# Patient Record
Sex: MALE | Race: WHITE | NOT HISPANIC OR LATINO | Employment: OTHER | URBAN - METROPOLITAN AREA
[De-identification: names, ages, dates, MRNs, and addresses within clinical notes are randomized per-mention and may not be internally consistent; named-entity substitution may affect disease eponyms.]

---

## 2018-08-08 PROCEDURE — 88305 TISSUE EXAM BY PATHOLOGIST: CPT | Performed by: PATHOLOGY

## 2018-08-09 ENCOUNTER — LAB REQUISITION (OUTPATIENT)
Dept: LAB | Facility: HOSPITAL | Age: 73
End: 2018-08-09
Payer: MEDICARE

## 2018-08-09 DIAGNOSIS — D12.3 BENIGN NEOPLASM OF TRANSVERSE COLON: ICD-10-CM

## 2018-08-09 DIAGNOSIS — K57.30 DIVERTICULOSIS OF LARGE INTESTINE WITHOUT PERFORATION OR ABSCESS WITHOUT BLEEDING: ICD-10-CM

## 2018-08-09 DIAGNOSIS — Z86.010 HISTORY OF COLONIC POLYPS: ICD-10-CM

## 2018-08-09 DIAGNOSIS — D12.4 BENIGN NEOPLASM OF DESCENDING COLON: ICD-10-CM

## 2019-09-13 ENCOUNTER — OFFICE VISIT (OUTPATIENT)
Dept: URGENT CARE | Facility: CLINIC | Age: 74
End: 2019-09-13
Payer: MEDICARE

## 2019-09-13 ENCOUNTER — APPOINTMENT (OUTPATIENT)
Dept: RADIOLOGY | Facility: CLINIC | Age: 74
End: 2019-09-13
Payer: MEDICARE

## 2019-09-13 VITALS
BODY MASS INDEX: 34.3 KG/M2 | HEART RATE: 63 BPM | DIASTOLIC BLOOD PRESSURE: 65 MMHG | TEMPERATURE: 96.8 F | SYSTOLIC BLOOD PRESSURE: 139 MMHG | RESPIRATION RATE: 20 BRPM | OXYGEN SATURATION: 99 % | HEIGHT: 70 IN | WEIGHT: 239.6 LBS
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
## 2019-09-13 PROCEDURE — 72220 X-RAY EXAM SACRUM TAILBONE: CPT

## 2019-09-13 PROCEDURE — 99213 OFFICE O/P EST LOW 20 MIN: CPT | Performed by: PHYSICIAN ASSISTANT

## 2019-09-13 PROCEDURE — G0463 HOSPITAL OUTPT CLINIC VISIT: HCPCS | Performed by: PHYSICIAN ASSISTANT

## 2019-09-13 RX ORDER — DIPHENOXYLATE HYDROCHLORIDE AND ATROPINE SULFATE 2.5; .025 MG/1; MG/1
1 TABLET ORAL DAILY
COMMUNITY

## 2019-09-13 RX ORDER — METOPROLOL SUCCINATE 50 MG/1
50 TABLET, EXTENDED RELEASE ORAL
COMMUNITY

## 2019-09-13 RX ORDER — HYDROCHLOROTHIAZIDE 25 MG/1
25 TABLET ORAL
COMMUNITY

## 2019-09-13 RX ORDER — OXYCODONE HYDROCHLORIDE 5 MG/1
5-10 TABLET ORAL EVERY 4 HOURS PRN
COMMUNITY
Start: 2019-08-19 | End: 2021-08-13 | Stop reason: ALTCHOICE

## 2019-09-13 RX ORDER — SENNA AND DOCUSATE SODIUM 50; 8.6 MG/1; MG/1
1 TABLET, FILM COATED ORAL 2 TIMES DAILY PRN
COMMUNITY
Start: 2019-08-20 | End: 2021-08-23

## 2019-09-13 RX ORDER — CELECOXIB 200 MG/1
200 CAPSULE ORAL DAILY
COMMUNITY
Start: 2019-08-21 | End: 2021-08-23

## 2019-09-13 RX ORDER — ESOMEPRAZOLE MAGNESIUM 40 MG/1
40 FOR SUSPENSION ORAL
COMMUNITY

## 2019-09-13 RX ORDER — RIBOFLAVIN (VITAMIN B2) 100 MG
100 TABLET ORAL DAILY
COMMUNITY

## 2019-09-13 RX ORDER — TRAMADOL HYDROCHLORIDE 50 MG/1
50 TABLET ORAL EVERY 6 HOURS PRN
COMMUNITY
Start: 2019-08-19 | End: 2021-08-13 | Stop reason: ALTCHOICE

## 2019-09-13 RX ORDER — METHOCARBAMOL 500 MG/1
500 TABLET, FILM COATED ORAL 4 TIMES DAILY PRN
COMMUNITY
Start: 2019-08-19 | End: 2021-08-13 | Stop reason: ALTCHOICE

## 2019-09-13 RX ORDER — SILODOSIN 8 MG/1
8 CAPSULE ORAL
COMMUNITY

## 2019-09-13 RX ORDER — ATORVASTATIN CALCIUM 10 MG/1
10 TABLET, FILM COATED ORAL
COMMUNITY

## 2019-09-13 NOTE — PROGRESS NOTES
[facility]        NAME: Harman Damon  is a 72 y o  male  : 1945    MRN: [id number]  DATE: 2019  TIME: 11:36 AM    Assessment and Plan   Fall, initial encounter [W19  XXXA]  1  Fall, initial encounter  XR sacrum and coccyx       Patient Instructions     Recommend rest, ice, heat, and OTC tylenol  Follow up with PCP in 3-5 days  Proceed to  ER if symptoms worsen  Chief Complaint     Chief Complaint   Patient presents with    Tailbone Pain     patient reports he fell yesterday while pulling out a small suitcase from car and fell on ground twisting  back landing on coccyx area  Reports pain is a 1, when it is shooting/stabbing it is positional   Took Tylenol at 0930 with little releif  History of Present Illness       Patient presents with complaint of pain between his buttocks after falling on his backside yesterday  Pt states that the pain is minimal at rest but reports sharp pain with changes in position  Pt reports taking Tylenol but denies other palliative measures  Pt denies paresthesias, radiation of pain, incontinence of bowel or bladder, chest pain, dyspnea, fever, and chills  Pt reports taking 200 mg Celebrex daily for his recent knee replacement  Pt denies knee pain or injuring the knee with yesterday's fall  Review of Systems   Review of Systems   Constitutional: Negative for chills, fatigue and fever  HENT: Negative for congestion, ear pain, postnasal drip, rhinorrhea and sore throat  Respiratory: Negative for cough, chest tightness and shortness of breath  Cardiovascular: Negative for chest pain  Gastrointestinal: Negative for abdominal pain, blood in stool, diarrhea, nausea and vomiting  Musculoskeletal: Positive for back pain  Negative for myalgias, neck pain and neck stiffness  Skin: Negative for color change, rash and wound  Neurological: Negative for dizziness, weakness, light-headedness, numbness and headaches     All other systems reviewed and are negative          Current Medications       Current Outpatient Medications:     aspirin 81 MG tablet, Take 81 mg by mouth daily, Disp: , Rfl:     celecoxib (CeleBREX) 200 mg capsule, Take 200 mg by mouth daily, Disp: , Rfl:     methocarbamol (ROBAXIN) 500 mg tablet, Take 500 mg by mouth 4 (four) times a day as needed, Disp: , Rfl:     oxyCODONE (ROXICODONE) 5 mg immediate release tablet, Take 5-10 mg by mouth every 4 (four) hours as needed, Disp: , Rfl:     senna-docusate sodium (SENOKOT-S) 8 6-50 mg per tablet, Take 1 tablet by mouth 2 (two) times a day as needed, Disp: , Rfl:     traMADol (ULTRAM) 50 mg tablet, Take 50 mg by mouth every 6 (six) hours as needed, Disp: , Rfl:     Ascorbic Acid (VITAMIN C) 100 MG tablet, Take 100 mg by mouth daily, Disp: , Rfl:     atorvastatin (LIPITOR) 10 mg tablet, Take 10 mg by mouth, Disp: , Rfl:     B Complex-C-Folic Acid (NEPHROCAPS PO), Take 1 capsule by mouth, Disp: , Rfl:     esomeprazole (NEXIUM) 40 mg packet, Take 40 mg by mouth, Disp: , Rfl:     Folic Acid-Cholecalciferol 1-3775 MG-UNIT CAPS, Take by mouth, Disp: , Rfl:     hydrochlorothiazide (HYDRODIURIL) 25 mg tablet, Take 25 mg by mouth, Disp: , Rfl:     metoprolol succinate (TOPROL-XL) 50 mg 24 hr tablet, Take 50 mg by mouth, Disp: , Rfl:     multivitamin (THERAGRAN) TABS, Take 1 tablet by mouth daily, Disp: , Rfl:     mupirocin (BACTROBAN) 2 % nasal ointment, into each nostril 2 (two) times a day, Disp: , Rfl:     Silodosin 8 MG CAPS, Take 8 mg by mouth, Disp: , Rfl:     Current Allergies     Allergies as of 09/13/2019    (No Known Allergies)            The following portions of the patient's history were reviewed and updated as appropriate: allergies, current medications, past family history, past medical history, past social history, past surgical history and problem list      Past Medical History:   Diagnosis Date    Hypercholesteremia        Past Surgical History: Procedure Laterality Date    KNEE ARTHROPLASTY         History reviewed  No pertinent family history  Medications have been verified  Objective   /65   Pulse 63   Temp (!) 96 8 °F (36 °C)   Resp 20   Ht 5' 10" (1 778 m)   Wt 109 kg (239 lb 9 6 oz)   SpO2 99%   BMI 34 38 kg/m²        Physical Exam     Physical Exam   Constitutional: He is oriented to person, place, and time  He appears well-developed and well-nourished  No distress  HENT:   Head: Normocephalic and atraumatic  Eyes: Pupils are equal, round, and reactive to light  Conjunctivae and EOM are normal    Neck: Normal range of motion  Neck supple  Cardiovascular: Normal rate, regular rhythm and normal heart sounds  Pulmonary/Chest: Effort normal and breath sounds normal  No respiratory distress  Musculoskeletal: He exhibits no edema or tenderness  No skin changes; NTTP over affected area; AROM limited d/t pain; LE strength intact; LE sensation intact   Lymphadenopathy:     He has no cervical adenopathy  Neurological: He is alert and oriented to person, place, and time  No cranial nerve deficit or sensory deficit  Skin: Skin is warm and dry  Capillary refill takes less than 2 seconds  No rash noted  He is not diaphoretic  Psychiatric: He has a normal mood and affect  His behavior is normal  Thought content normal    Nursing note and vitals reviewed

## 2020-07-28 ENCOUNTER — APPOINTMENT (EMERGENCY)
Dept: RADIOLOGY | Facility: HOSPITAL | Age: 75
End: 2020-07-28
Payer: MEDICARE

## 2020-07-28 ENCOUNTER — HOSPITAL ENCOUNTER (EMERGENCY)
Facility: HOSPITAL | Age: 75
Discharge: HOME | End: 2020-07-28
Attending: EMERGENCY MEDICINE | Admitting: EMERGENCY MEDICINE
Payer: MEDICARE

## 2020-07-28 VITALS
DIASTOLIC BLOOD PRESSURE: 63 MMHG | OXYGEN SATURATION: 97 % | TEMPERATURE: 98.3 F | RESPIRATION RATE: 18 BRPM | SYSTOLIC BLOOD PRESSURE: 139 MMHG | HEART RATE: 60 BPM
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
## 2020-07-28 DIAGNOSIS — S76.019A HIP STRAIN: ICD-10-CM

## 2020-07-28 DIAGNOSIS — S76.212A GROIN STRAIN, LEFT, INITIAL ENCOUNTER: Primary | ICD-10-CM

## 2020-07-28 PROCEDURE — 73552 X-RAY EXAM OF FEMUR 2/>: CPT

## 2020-07-28 PROCEDURE — 72170 X-RAY EXAM OF PELVIS: CPT

## 2020-07-28 PROCEDURE — 99284 EMERGENCY DEPT VISIT MOD MDM: CPT | Performed by: EMERGENCY MEDICINE

## 2020-07-28 PROCEDURE — 99283 EMERGENCY DEPT VISIT LOW MDM: CPT
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
## 2021-06-28 ENCOUNTER — APPOINTMENT (OUTPATIENT)
Dept: LAB | Facility: AMBULARY SURGERY CENTER | Age: 76
End: 2021-06-28
Payer: MEDICARE

## 2021-06-28 ENCOUNTER — TELEPHONE (OUTPATIENT)
Dept: UROLOGY | Facility: MEDICAL CENTER | Age: 76
End: 2021-06-28

## 2021-06-28 DIAGNOSIS — R30.0 DYSURIA: Primary | ICD-10-CM

## 2021-06-28 DIAGNOSIS — R30.0 DYSURIA: ICD-10-CM

## 2021-06-28 LAB
BACTERIA UR QL AUTO: ABNORMAL /HPF
BILIRUB UR QL STRIP: NEGATIVE
CLARITY UR: ABNORMAL
COLOR UR: YELLOW
GLUCOSE UR STRIP-MCNC: NEGATIVE MG/DL
HGB UR QL STRIP.AUTO: ABNORMAL
KETONES UR STRIP-MCNC: ABNORMAL MG/DL
LEUKOCYTE ESTERASE UR QL STRIP: ABNORMAL
NITRITE UR QL STRIP: POSITIVE
NON-SQ EPI CELLS URNS QL MICRO: ABNORMAL /HPF
PH UR STRIP.AUTO: 6 [PH]
PROT UR STRIP-MCNC: ABNORMAL MG/DL
RBC #/AREA URNS AUTO: ABNORMAL /HPF
SP GR UR STRIP.AUTO: 1.01 (ref 1–1.03)
UROBILINOGEN UR QL STRIP.AUTO: 0.2 E.U./DL
WBC #/AREA URNS AUTO: ABNORMAL /HPF

## 2021-06-28 PROCEDURE — 87086 URINE CULTURE/COLONY COUNT: CPT

## 2021-06-28 PROCEDURE — 81001 URINALYSIS AUTO W/SCOPE: CPT

## 2021-06-28 PROCEDURE — 87186 SC STD MICRODIL/AGAR DIL: CPT

## 2021-06-28 PROCEDURE — 87077 CULTURE AEROBIC IDENTIFY: CPT

## 2021-06-28 RX ORDER — CEPHALEXIN 500 MG/1
500 CAPSULE ORAL EVERY 12 HOURS SCHEDULED
Qty: 10 CAPSULE | Refills: 0 | Status: SHIPPED | OUTPATIENT
Start: 2021-06-28 | End: 2021-07-03

## 2021-06-28 NOTE — TELEPHONE ENCOUNTER
Called Will Nichols and notified him that script were put in for him to give a urine sample  He will go to [facility] [place] as that is close to his house   We will notify him when we get results

## 2021-06-28 NOTE — TELEPHONE ENCOUNTER
Recommend patient go for urine testing, to confirm infection and obtain sensitivity  Orders in [address]

## 2021-06-28 NOTE — TELEPHONE ENCOUNTER
Patient of Dr Saint Clair  Patient was advised by answer service we are taking calls for doctor office  Patient has uti  Patient was out of town was given medication but medication is not working  Patient is having discomfort,urgency and burning along with incontinence  Patient was prescribed levofloxacin 500 mg for last 3 days and has not improved  [facility] [place]

## 2021-06-28 NOTE — TELEPHONE ENCOUNTER
Patient is patient of  [facility] he was prescribed abx but I don't see any urine studies done   Please advise If I can send him for studies or if he should go to urgent care/

## 2021-06-28 NOTE — TELEPHONE ENCOUNTER
Patient requesting call back to discuss results  Patient in a lot of discomfort but understands we need results

## 2021-06-29 NOTE — TELEPHONE ENCOUNTER
Called Fuentes Duran back and notified him of  Kelflex being sent to pharmacy  He states he took Levoquin today that he has as a standing order for this   Told him to wait to take Keflex until tomorrow

## 2021-06-30 LAB — BACTERIA UR CULT: ABNORMAL

## 2021-06-30 NOTE — TELEPHONE ENCOUNTER
Patient's urine culture confirmed infection  He was noted to have resistance to Levaquin which is why antibiotic was ineffective  However, he should complete Keflex as prescribed  [facility]

## 2021-06-30 NOTE — TELEPHONE ENCOUNTER
Call placed to patient, advised per provider urine culture confirmed infection  He was noted to have resistance to Levaquin which is why antibiotic was ineffective  However, he should complete Keflex as prescribed  Advised patient to  continue follow-up with Dr Jose Raul Omer  Patient verbalized understanding and agrees with plan

## 2021-07-29 ENCOUNTER — TELEPHONE (OUTPATIENT)
Dept: OTHER | Facility: HOSPITAL | Age: 76
End: 2021-07-29

## 2021-07-29 ENCOUNTER — TELEPHONE (OUTPATIENT)
Dept: UROLOGY | Facility: CLINIC | Age: 76
End: 2021-07-29

## 2021-07-29 NOTE — TELEPHONE ENCOUNTER
I spoke with Dr Cal Rocha about this patient who has BPH and a history of urethral stricture and persistent urinary retention  I believe the patient is an appropriate candidate for the Uro lift based upon his anatomy as described  I would not recommend a transurethral resection of the prostate based on his small prostate size and his history of urethral stricture disease  Dr Nilo Beck as recommended sending him to our practice to be scheduled for a Uro lift procedure for me  I am happy to do this on behalf of the patient  Please schedule the patient for a preop visit with 1 of our advanced practitioners the Nilo Quick please also hold a date for Uro lift at the [place] [id number] with me    Case request can be placed by the AP when he is seen in the office

## 2021-08-02 ENCOUNTER — TELEPHONE (OUTPATIENT)
Dept: OTHER | Facility: OTHER | Age: 76
End: 2021-08-02

## 2021-08-02 NOTE — TELEPHONE ENCOUNTER
Pt said the catheter script that was sent to the pharmacy is going to take 10 days to refill and he is currently all out of catheters

## 2021-08-03 NOTE — TELEPHONE ENCOUNTER
Patient of Dr Tariq Hinton who will be seeing our office only for urolift  I do not see any recent scripts in our system  Please advise patient to reach out to Dr Tariq Hinton for his catheters

## 2021-08-03 NOTE — TELEPHONE ENCOUNTER
Called patient  He notes he called Dr Billy Beltre office and he is out of the office on vacation  The answering machine gave him message to call our office  He only has 2 days supply left  Please call at [phone]

## 2021-08-03 NOTE — TELEPHONE ENCOUNTER
Patient stopped into [facility]or [facility] office  He reports he had script from Dr Ko Payment office sent to [facility]  He states something was not filled out correctly, but now Dr Nestor Mcgee is on vacation and cannot get catheters  Patient provided with samples of what we had in the office to hold him over until Dr Nestor Mcgee gets back  He was very thankful for this and knows to call if he needs more in the interim

## 2021-08-13 ENCOUNTER — TELEPHONE (OUTPATIENT)
Dept: UROLOGY | Facility: AMBULATORY SURGERY CENTER | Age: 76
End: 2021-08-13

## 2021-08-13 ENCOUNTER — CONSULT (OUTPATIENT)
Dept: UROLOGY | Facility: CLINIC | Age: 76
End: 2021-08-13
Payer: MEDICARE

## 2021-08-13 VITALS
WEIGHT: 236 LBS | BODY MASS INDEX: 34.96 KG/M2 | SYSTOLIC BLOOD PRESSURE: 130 MMHG | HEIGHT: 69 IN | HEART RATE: 61 BPM | DIASTOLIC BLOOD PRESSURE: 68 MMHG

## 2021-08-13 DIAGNOSIS — N40.1 BENIGN PROSTATIC HYPERPLASIA WITH URINARY RETENTION: ICD-10-CM

## 2021-08-13 DIAGNOSIS — R30.0 DYSURIA: Primary | ICD-10-CM

## 2021-08-13 DIAGNOSIS — R33.8 BENIGN PROSTATIC HYPERPLASIA WITH URINARY RETENTION: ICD-10-CM

## 2021-08-13 PROBLEM — I10 HYPERTENSION: Status: ACTIVE | Noted: 2021-08-13

## 2021-08-13 PROBLEM — J45.909 UNCOMPLICATED ASTHMA: Status: ACTIVE | Noted: 2021-08-13

## 2021-08-13 PROBLEM — E78.00 HIGH CHOLESTEROL: Status: ACTIVE | Noted: 2021-08-13

## 2021-08-13 PROBLEM — K22.70 BARRETT'S ESOPHAGUS: Status: ACTIVE | Noted: 2021-08-13

## 2021-08-13 LAB — POST-VOID RESIDUAL VOLUME, ML POC: 0 ML

## 2021-08-13 PROCEDURE — 51798 US URINE CAPACITY MEASURE: CPT | Performed by: PHYSICIAN ASSISTANT

## 2021-08-13 PROCEDURE — 99203 OFFICE O/P NEW LOW 30 MIN: CPT | Performed by: PHYSICIAN ASSISTANT

## 2021-08-13 RX ORDER — CEPHALEXIN 500 MG/1
CAPSULE ORAL
COMMUNITY
End: 2021-08-13 | Stop reason: ALTCHOICE

## 2021-08-13 RX ORDER — FLUOROURACIL 50 MG/G
CREAM TOPICAL
COMMUNITY
End: 2021-08-13 | Stop reason: ALTCHOICE

## 2021-08-13 RX ORDER — BUDESONIDE 90 UG/1
AEROSOL, POWDER RESPIRATORY (INHALATION) AS NEEDED
COMMUNITY

## 2021-08-13 RX ORDER — PREDNISONE 20 MG/1
TABLET ORAL DAILY
COMMUNITY
Start: 2021-08-06 | End: 2021-08-13 | Stop reason: ALTCHOICE

## 2021-08-13 RX ORDER — CHLORAL HYDRATE 500 MG
CAPSULE ORAL
COMMUNITY

## 2021-08-13 RX ORDER — METHENAMINE HIPPURATE 1000 MG/1
TABLET ORAL
COMMUNITY
End: 2021-08-13 | Stop reason: ALTCHOICE

## 2021-08-13 RX ORDER — AZITHROMYCIN 250 MG/1
TABLET, FILM COATED ORAL
COMMUNITY
Start: 2021-08-06 | End: 2021-08-13 | Stop reason: ALTCHOICE

## 2021-08-13 RX ORDER — DOXYCYCLINE HYCLATE 20 MG
TABLET ORAL
COMMUNITY
End: 2021-08-13 | Stop reason: ALTCHOICE

## 2021-08-13 RX ORDER — ALBUTEROL SULFATE 90 UG/1
1 AEROSOL, METERED RESPIRATORY (INHALATION) AS NEEDED
COMMUNITY

## 2021-08-13 RX ORDER — SULFAMETHOXAZOLE AND TRIMETHOPRIM 800; 160 MG/1; MG/1
TABLET ORAL EVERY 12 HOURS
COMMUNITY
End: 2021-08-13 | Stop reason: ALTCHOICE

## 2021-08-13 RX ORDER — GUAIFENESIN AND CODEINE PHOSPHATE 100; 10 MG/5ML; MG/5ML
5 SOLUTION ORAL AS NEEDED
COMMUNITY
Start: 2021-08-06

## 2021-08-13 RX ORDER — TADALAFIL 5 MG/1
TABLET ORAL
COMMUNITY
End: 2021-08-13 | Stop reason: ALTCHOICE

## 2021-08-13 RX ORDER — CLOTRIMAZOLE AND BETAMETHASONE DIPROPIONATE 10; .64 MG/G; MG/G
CREAM TOPICAL
COMMUNITY
End: 2021-08-23

## 2021-08-13 RX ORDER — DEXAMETHASONE 4 MG/1
1 TABLET ORAL AS NEEDED
COMMUNITY

## 2021-08-13 RX ORDER — BENZONATATE 200 MG/1
200 CAPSULE ORAL 3 TIMES DAILY PRN
COMMUNITY
Start: 2021-08-06 | End: 2021-08-13

## 2021-08-13 RX ORDER — RAMELTEON 8 MG/1
TABLET ORAL
COMMUNITY
End: 2021-08-13 | Stop reason: ALTCHOICE

## 2021-08-13 RX ORDER — NITROFURANTOIN 25; 75 MG/1; MG/1
CAPSULE ORAL
COMMUNITY
End: 2021-08-13 | Stop reason: ALTCHOICE

## 2021-08-13 NOTE — LETTER
August 13, 2021     Javed Starks[address]  [address] [address]    Patient: Matt Ramsey  YOB: 1945   Date of Visit: 8/13/2021       Dear Dr Hitesh Corral Recipients: Thank you for referring Starr Gates to me for evaluation  Below are my notes for this consultation  If you have questions, please do not hesitate to call me  I look forward to following your patient along with you  Sincerely,        Shiraz Pires PA-C        CC: No Recipients  Shiraz Pires PA-C  8/13/2021  1:56 PM  Cosign Needed  Assessment/Plan:      Benign prostatic hyperplasia with urinary retention  Prior patient of Dr Patience Peters who referred patient to [facility] for Urology for surgical intervention  Patient presenting today for to establish care and for H&P      patient currently managed on 8 mg of Silodosin and CIC  Unable to obtain pre operative workup for UroLift given the patient's CIC due to urinary retention  discussed with patient that he should get his documentation from Dr Lucila Gannon so that we may better manage patient's care  Discussed UroLift procedure in depth  Discussed that recovery time is typically 3 days postoperative  Patient may or may not require Dutton catheter postoperatively  Discussed risk of bleeding, infection, risk of failure and need for additional operation such as transurethral resection of the prostate, incontinence and erectile diff dysfunction although rare  UroLift pamphlet provided  Will place case request       Consent to be obtained day of procedure  Patient is currently agreeable to plan  Problem List Items Addressed This Visit        Genitourinary    Benign prostatic hyperplasia with urinary retention     Prior patient of Dr Patience Peters who referred patient to [facility] for Urology for surgical intervention    Patient presenting today for to establish care and for H&P      patient currently managed on 8 mg of Silodosin and CIC  Unable to obtain pre operative workup for UroLift given the patient's CIC due to urinary retention  discussed with patient that he should get his documentation from Dr Yenifer Mohamud so that we may better manage patient's care  Discussed UroLift procedure in depth  Discussed that recovery time is typically 3 days postoperative  Patient may or may not require Dutton catheter postoperatively  Discussed risk of bleeding, infection, risk of failure and need for additional operation such as transurethral resection of the prostate, incontinence and erectile diff dysfunction although rare  UroLift pamphlet provided  Will place case request       Consent to be obtained day of procedure  Patient is currently agreeable to plan  Other Visit Diagnoses     Dysuria    -  Primary    Relevant Orders    POCT Measure PVR (Completed)    Urine culture            Subjective:      Patient ID: Inell Angela  is a 70 y o  male  HPI  Patient is a 68-year-old male  With a medical history of hypertension, hypercholesterolemia, and Hale's esophagus known to Dr [facility]GUYEN [facility] presents to establish care with our practice  Dr Rehana Prince had will reach out to Dr Eli Paz regarding patient's history including BPH, urethral stricture and persistent urinary retention  Patient currently reports that he straight catheterizes at home  Straight cathed prior to coming to appointment therefore unable to perform uroflow and additional urine studies  Patient was referred to [facility] center for Urology for UroLift procedure  Patient currently reporting that he is having a cold  Denies that this is COVID  His  Cold is exacerbated by his asthma  He reports that he has only had structural dilations along with Knee surgery  Denies any adverse reactions to anesthesia     Patient does report that he is a smoker and smokes daily with cigars  AUA SYMPTOM SCORE      Most Recent Value   AUA SYMPTOM SCORE   How often have you had a sensation of not emptying your bladder completely after you finished urinating? 5   How often have you had to urinate again less than two hours after you finished urinating? 4   How often have you found you stopped and started again several times when you urinate? 5   How often have you found it difficult to postpone urination? 3   How often have you had a weak urinary stream?  5   How often have you had to push or strain to begin urination? 5   How many times did you most typically get up to urinate from the time you went to bed at night until the time you got up in the morning? 2   Quality of Life: If you were to spend the rest of your life with your urinary condition just the way it is now, how would you feel about that?  --   AUA SYMPTOM SCORE  29            The following portions of the patient's history were reviewed and updated as appropriate:   He  has a past medical history of Asthma, Benign prostatic hyperplasia, Hypercholesteremia, and Urinary retention  He   Patient Active Problem List    Diagnosis Date Noted    Hypertension 08/13/2021    Hale's esophagus 08/13/2021    High cholesterol 77/65/7887    Uncomplicated asthma 49/82/4028    Benign prostatic hyperplasia with urinary retention 08/13/2021     He  has a past surgical history that includes Knee Arthroplasty; Knee surgery; Hernia repair; and Cystoscopy  His family history is not on file  He  reports that he has been smoking cigars  He uses smokeless tobacco  He reports current alcohol use  He reports that he does not use drugs    Current Outpatient Medications   Medication Sig Dispense Refill    albuterol (Ventolin HFA) 90 mcg/act inhaler Ventolin HFA 90 mcg/actuation aerosol inhaler      Ascorbic Acid (VITAMIN C) 100 MG tablet Take 100 mg by mouth daily      aspirin 81 MG tablet Take 81 mg by mouth daily      atorvastatin (LIPITOR) 10 mg tablet Take 10 mg by mouth      B Complex-C-Folic Acid (NEPHROCAPS PO) Take 1 capsule by mouth      benzonatate (TESSALON) 200 MG capsule Take 200 mg by mouth Three times daily as needed      clotrimazole-betamethasone (LOTRISONE) 1-0 05 % cream clotrimazole-betamethasone 1 %-0 05 % topical cream      esomeprazole (NEXIUM) 40 mg packet Take 40 mg by mouth      fluticasone (Flovent HFA) 110 MCG/ACT inhaler Flovent  mcg/actuation aerosol inhaler      guaifenesin-codeine (GUAIFENESIN AC) 100-10 MG/5ML liquid Take 5 mL by mouth      hydrochlorothiazide (HYDRODIURIL) 25 mg tablet Take 25 mg by mouth      metoprolol succinate (TOPROL-XL) 50 mg 24 hr tablet Take 50 mg by mouth      multivitamin (THERAGRAN) TABS Take 1 tablet by mouth daily      Omega-3 Fatty Acids (fish oil) 1,000 mg Fish Oil      Silodosin 8 MG CAPS Take 8 mg by mouth      budesonide (Pulmicort Flexhaler) 90 MCG/ACT inhaler Every 12 hours      celecoxib (CeleBREX) 200 mg capsule Take 200 mg by mouth daily      senna-docusate sodium (SENOKOT-S) 8 6-50 mg per tablet Take 1 tablet by mouth 2 (two) times a day as needed       No current facility-administered medications for this visit  He has No Known Allergies       Review of Systems   Constitutional: Negative  Negative for chills and fever  HENT: Positive for congestion  Eyes: Negative  Respiratory: Positive for cough and wheezing  Cardiovascular: Negative  Gastrointestinal: Negative  Negative for abdominal pain, diarrhea, nausea and vomiting  Endocrine: Negative  Genitourinary: Positive for difficulty urinating  Negative for dysuria, enuresis, flank pain, frequency, hematuria and urgency  Musculoskeletal: Negative  Allergic/Immunologic: Negative  Neurological: Negative  Hematological: Negative  Psychiatric/Behavioral: Negative            Objective:      /68 (BP Location: Left arm, Patient Position: Sitting, Cuff Size: Adult) Pulse 61   Ht 5' 9" (1 753 m)   Wt 107 kg (236 lb)   BMI 34 85 kg/m²          Physical Exam  Constitutional:       General: He is not in acute distress  Appearance: He is normal weight  He is not ill-appearing, toxic-appearing or diaphoretic  HENT:      Head: Normocephalic and atraumatic  Right Ear: External ear normal       Left Ear: External ear normal       Nose: Nose normal       Mouth/Throat:      Pharynx: Oropharynx is clear  Eyes:      General: No scleral icterus  Conjunctiva/sclera: Conjunctivae normal    Cardiovascular:      Rate and Rhythm: Normal rate and regular rhythm  Pulses: Normal pulses  Heart sounds: Normal heart sounds  No murmur heard  No friction rub  No gallop  Pulmonary:      Effort: Pulmonary effort is normal  No respiratory distress  Breath sounds: No stridor  Wheezing present  No rhonchi or rales  Comments: Wheezes in  Left lower base  Abdominal:      General: Bowel sounds are normal  There is no distension  Tenderness: There is no abdominal tenderness  There is no right CVA tenderness or left CVA tenderness  Musculoskeletal:         General: Normal range of motion  Cervical back: Normal range of motion  Skin:     General: Skin is warm and dry  Neurological:      General: No focal deficit present  Mental Status: He is alert and oriented to person, place, and time  Psychiatric:         Mood and Affect: Mood normal          Behavior: Behavior normal          Thought Content:  Thought content normal          Judgment: Judgment normal            Norman Mesa PA-C

## 2021-08-13 NOTE — ASSESSMENT & PLAN NOTE
Prior patient of Dr Ledy Hess who referred patient to [facility] center for Urology for surgical intervention  Patient presenting today for to establish care and for H&P      patient currently managed on 8 mg of Silodosin and CIC  Unable to obtain pre operative workup for UroLift given the patient's CIC due to urinary retention  discussed with patient that he should get his documentation from Dr Ethan Pacheco so that we may better manage patient's care  Discussed UroLift procedure in depth  Discussed that recovery time is typically 3 days postoperative  Patient may or may not require Dutton catheter postoperatively  Discussed risk of bleeding, infection, risk of failure and need for additional operation such as transurethral resection of the prostate, incontinence and erectile diff dysfunction although rare  UroLift pamphlet provided  Will place case request       Consent to be obtained day of procedure  Patient is currently agreeable to plan

## 2021-08-13 NOTE — PROGRESS NOTES
Assessment/Plan:      Benign prostatic hyperplasia with urinary retention  Prior patient of Dr Goodman who referred patient to [facility] for Urology for surgical intervention  Patient presenting today for to establish care and for H&P      patient currently managed on 8 mg of Silodosin and CIC  Unable to obtain pre operative workup for UroLift given the patient's CIC due to urinary retention  discussed with patient that he should get his documentation from Dr Jay Jay Ibarra so that we may better manage patient's care  Discussed UroLift procedure in depth  Discussed that recovery time is typically 3 days postoperative  Patient may or may not require Dutton catheter postoperatively  Discussed risk of bleeding, infection, risk of failure and need for additional operation such as transurethral resection of the prostate, incontinence and erectile diff dysfunction although rare  UroLift pamphlet provided  Will place case request       Consent to be obtained day of procedure  Patient is currently agreeable to plan  Problem List Items Addressed This Visit        Genitourinary    Benign prostatic hyperplasia with urinary retention     Prior patient of Dr Goodman who referred patient to [facility] for Urology for surgical intervention  Patient presenting today for to establish care and for H&P      patient currently managed on 8 mg of Silodosin and CIC  Unable to obtain pre operative workup for UroLift given the patient's CIC due to urinary retention  discussed with patient that he should get his documentation from Dr Jay Jay Ibarra so that we may better manage patient's care  Discussed UroLift procedure in depth  Discussed that recovery time is typically 3 days postoperative  Patient may or may not require Dutton catheter postoperatively    Discussed risk of bleeding, infection, risk of failure and need for additional operation such as transurethral resection of the prostate, incontinence and erectile diff dysfunction although rare  UroLift pamphlet provided  Will place case request       Consent to be obtained day of procedure  Patient is currently agreeable to plan  Other Visit Diagnoses     Dysuria    -  Primary    Relevant Orders    POCT Measure PVR (Completed)    Urine culture            Subjective:      Patient ID: Rizwana Park  is a 70 y o  male  HPI  Patient is a 81-year-old male  With a medical history of hypertension, hypercholesterolemia, and Hale's esophagus known to Dr Saint Clair presents to establish care with our practice  Dr Brar Said had will reach out to Dr Evelyn Mcclellan regarding patient's history including BPH, urethral stricture and persistent urinary retention  Patient currently reports that he straight catheterizes at home  Straight cathed prior to coming to appointment therefore unable to perform uroflow and additional urine studies  Patient was referred to [facility] center for Urology for UroLift procedure  Patient currently reporting that he is having a cold  Denies that this is COVID  His  Cold is exacerbated by his asthma  He reports that he has only had structural dilations along with Knee surgery  Denies any adverse reactions to anesthesia  Patient does report that he is a smoker and smokes daily with cigars  AUA SYMPTOM SCORE      Most Recent Value   AUA SYMPTOM SCORE   How often have you had a sensation of not emptying your bladder completely after you finished urinating? 5   How often have you had to urinate again less than two hours after you finished urinating? 4   How often have you found you stopped and started again several times when you urinate? 5   How often have you found it difficult to postpone urination? 3   How often have you had a weak urinary stream?  5   How often have you had to push or strain to begin urination?   5   How many times did you most typically get up to urinate from the time you went to bed at night until the time you got up in the morning? 2   Quality of Life: If you were to spend the rest of your life with your urinary condition just the way it is now, how would you feel about that?  --   AUA SYMPTOM SCORE  29            The following portions of the patient's history were reviewed and updated as appropriate:   He  has a past medical history of Asthma, Benign prostatic hyperplasia, Hypercholesteremia, and Urinary retention  He   Patient Active Problem List    Diagnosis Date Noted    Hypertension 08/13/2021    Hale's esophagus 08/13/2021    High cholesterol 34/44/0154    Uncomplicated asthma 37/53/4422    Benign prostatic hyperplasia with urinary retention 08/13/2021     He  has a past surgical history that includes Knee Arthroplasty; Knee surgery; Hernia repair; and Cystoscopy  His family history is not on file  He  reports that he has been smoking cigars  He uses smokeless tobacco  He reports current alcohol use  He reports that he does not use drugs    Current Outpatient Medications   Medication Sig Dispense Refill    albuterol (Ventolin HFA) 90 mcg/act inhaler Ventolin HFA 90 mcg/actuation aerosol inhaler      Ascorbic Acid (VITAMIN C) 100 MG tablet Take 100 mg by mouth daily      aspirin 81 MG tablet Take 81 mg by mouth daily      atorvastatin (LIPITOR) 10 mg tablet Take 10 mg by mouth      B Complex-C-Folic Acid (NEPHROCAPS PO) Take 1 capsule by mouth      benzonatate (TESSALON) 200 MG capsule Take 200 mg by mouth Three times daily as needed      clotrimazole-betamethasone (LOTRISONE) 1-0 05 % cream clotrimazole-betamethasone 1 %-0 05 % topical cream      esomeprazole (NEXIUM) 40 mg packet Take 40 mg by mouth      fluticasone (Flovent HFA) 110 MCG/ACT inhaler Flovent  mcg/actuation aerosol inhaler      guaifenesin-codeine (GUAIFENESIN AC) 100-10 MG/5ML liquid Take 5 mL by mouth      hydrochlorothiazide (HYDRODIURIL) 25 mg tablet Take 25 mg by mouth      metoprolol succinate (TOPROL-XL) 50 mg 24 hr tablet Take 50 mg by mouth      multivitamin (THERAGRAN) TABS Take 1 tablet by mouth daily      Omega-3 Fatty Acids (fish oil) 1,000 mg Fish Oil      Silodosin 8 MG CAPS Take 8 mg by mouth      budesonide (Pulmicort Flexhaler) 90 MCG/ACT inhaler Every 12 hours      celecoxib (CeleBREX) 200 mg capsule Take 200 mg by mouth daily      senna-docusate sodium (SENOKOT-S) 8 6-50 mg per tablet Take 1 tablet by mouth 2 (two) times a day as needed       No current facility-administered medications for this visit  He has No Known Allergies       Review of Systems   Constitutional: Negative  Negative for chills and fever  HENT: Positive for congestion  Eyes: Negative  Respiratory: Positive for cough and wheezing  Cardiovascular: Negative  Gastrointestinal: Negative  Negative for abdominal pain, diarrhea, nausea and vomiting  Endocrine: Negative  Genitourinary: Positive for difficulty urinating  Negative for dysuria, enuresis, flank pain, frequency, hematuria and urgency  Musculoskeletal: Negative  Allergic/Immunologic: Negative  Neurological: Negative  Hematological: Negative  Psychiatric/Behavioral: Negative  Objective:      /68 (BP Location: Left arm, Patient Position: Sitting, Cuff Size: Adult)   Pulse 61   Ht 5' 9" (1 753 m)   Wt 107 kg (236 lb)   BMI 34 85 kg/m²          Physical Exam  Constitutional:       General: He is not in acute distress  Appearance: He is normal weight  He is not ill-appearing, toxic-appearing or diaphoretic  HENT:      Head: Normocephalic and atraumatic  Right Ear: External ear normal       Left Ear: External ear normal       Nose: Nose normal       Mouth/Throat:      Pharynx: Oropharynx is clear  Eyes:      General: No scleral icterus       Conjunctiva/sclera: Conjunctivae normal    Cardiovascular:      Rate and Rhythm: Normal rate and regular rhythm  Pulses: Normal pulses  Heart sounds: Normal heart sounds  No murmur heard  No friction rub  No gallop  Pulmonary:      Effort: Pulmonary effort is normal  No respiratory distress  Breath sounds: No stridor  Wheezing present  No rhonchi or rales  Comments: Wheezes in  Left lower base  Abdominal:      General: Bowel sounds are normal  There is no distension  Tenderness: There is no abdominal tenderness  There is no right CVA tenderness or left CVA tenderness  Musculoskeletal:         General: Normal range of motion  Cervical back: Normal range of motion  Skin:     General: Skin is warm and dry  Neurological:      General: No focal deficit present  Mental Status: He is alert and oriented to person, place, and time  Psychiatric:         Mood and Affect: Mood normal          Behavior: Behavior normal          Thought Content:  Thought content normal          Judgment: Judgment normal            Francisco J Ng PA-C

## 2021-08-13 NOTE — TELEPHONE ENCOUNTER
I spoke with pt this afternoon to discuss scheduling him for a cysto/Urolift with Dr Jamie Ruano at the [facility] on 8/27/21  Pt stated that he will end his plans and take that date  I verbally went over all of pt 's pre op instructions and prep information with him  He is aware that he needs to have a urine culture done at a [facility]'s lab as soon as possible  Per pt 's request I am mailing him  A copy of his surgical packet to [address] [place] [address]   Pt was instructed to call our office with any questions or concerns regarding this procedure

## 2021-08-17 ENCOUNTER — ANESTHESIA EVENT (OUTPATIENT)
Dept: PERIOP | Facility: AMBULARY SURGERY CENTER | Age: 76
End: 2021-08-17
Payer: MEDICARE

## 2021-08-23 ENCOUNTER — APPOINTMENT (OUTPATIENT)
Dept: LAB | Facility: CLINIC | Age: 76
End: 2021-08-23
Payer: MEDICARE

## 2021-08-23 DIAGNOSIS — R30.0 DYSURIA: ICD-10-CM

## 2021-08-23 PROCEDURE — 87086 URINE CULTURE/COLONY COUNT: CPT

## 2021-08-23 PROCEDURE — 87077 CULTURE AEROBIC IDENTIFY: CPT

## 2021-08-23 PROCEDURE — 87186 SC STD MICRODIL/AGAR DIL: CPT

## 2021-08-23 RX ORDER — FINASTERIDE 5 MG/1
5 TABLET, FILM COATED ORAL DAILY
COMMUNITY

## 2021-08-23 NOTE — PRE-PROCEDURE INSTRUCTIONS
Pre-Surgery Instructions:   Medication Instructions    albuterol (Ventolin HFA) 90 mcg/act inhaler ok to use DOS prn    Ascorbic Acid (VITAMIN C) 100 MG tablet Instructed to avoid all ASA and OTC Vit/Supp 1 week prior to surgery and to avoid NSAIDs 3 days prior to surgery per anesthesia instructions  Tylenol ok to take prn   aspirin 81 MG tablet Instructed to avoid all ASA and OTC Vit/Supp 1 week prior to surgery and to avoid NSAIDs 3 days prior to surgery per anesthesia instructions  Tylenol ok to take prn   atorvastatin (LIPITOR) 10 mg tablet Instructed to take per normal schedule including DOS with sips water    B Complex-C-Folic Acid (NEPHROCAPS PO) Instructed to avoid all ASA and OTC Vit/Supp 1 week prior to surgery and to avoid NSAIDs 3 days prior to surgery per anesthesia instructions  Tylenol ok to take prn   budesonide (Pulmicort Flexhaler) 90 MCG/ACT inhaler ok to use DOS prn    esomeprazole (NEXIUM) 40 mg packet Instructed to take per normal schedule including DOS with sips water    finasteride (PROSCAR) 5 mg tablet Instructed to take per normal schedule including DOS with sips water    fluticasone (Flovent HFA) 110 MCG/ACT inhaler Ok to use DOS prn    guaifenesin-codeine (GUAIFENESIN AC) 100-10 MG/5ML liquid Instructed to take per normal schedule except DOS    hydrochlorothiazide (HYDRODIURIL) 25 mg tablet Instructed to take per normal schedule except DOS    metoprolol succinate (TOPROL-XL) 50 mg 24 hr tablet Instructed to take per normal schedule including DOS with sips water    multivitamin (THERAGRAN) TABS Instructed to avoid all ASA and OTC Vit/Supp 1 week prior to surgery and to avoid NSAIDs 3 days prior to surgery per anesthesia instructions  Tylenol ok to take prn   Omega-3 Fatty Acids (fish oil) 1,000 mg Instructed to avoid all ASA and OTC Vit/Supp 1 week prior to surgery and to avoid NSAIDs 3 days prior to surgery per anesthesia instructions  Tylenol ok to take prn      Silodosin 8 MG CAPS Instructed to take per normal schedule including DOS with sips water     Have you had / have a sore throat? No  Have you had / have a cough less than 1 week? No  Have you had / have a fever greater than 100 0 - 100  4? No  Are you experiencing any shortness of breath? No  Fully vaccinated  Reviewed with patient via phone medications and showering instructions  Advised not to take NSAID's, ok tylenol products  Advised patient that [facility] will call with surgery arrival time and hospital directions  Advised patient nothing eat or drink after midnight  Patient verbalized understanding and knows to call surgeon's office with any additional questions prior to surgery

## 2021-08-26 LAB — BACTERIA UR CULT: ABNORMAL

## 2021-08-27 ENCOUNTER — HOSPITAL ENCOUNTER (OUTPATIENT)
Facility: AMBULARY SURGERY CENTER | Age: 76
Setting detail: OUTPATIENT SURGERY
Discharge: HOME | End: 2021-08-27
Attending: UROLOGY | Admitting: UROLOGY
Payer: MEDICARE

## 2021-08-27 ENCOUNTER — ANESTHESIA (OUTPATIENT)
Dept: PERIOP | Facility: AMBULARY SURGERY CENTER | Age: 76
End: 2021-08-27
Payer: MEDICARE

## 2021-08-27 VITALS
RESPIRATION RATE: 18 BRPM | TEMPERATURE: 97.6 F | WEIGHT: 236 LBS | OXYGEN SATURATION: 97 % | HEIGHT: 69 IN | SYSTOLIC BLOOD PRESSURE: 114 MMHG | HEART RATE: 58 BPM | DIASTOLIC BLOOD PRESSURE: 64 MMHG | BODY MASS INDEX: 34.96 KG/M2

## 2021-08-27 DIAGNOSIS — N40.1 BENIGN PROSTATIC HYPERPLASIA WITH URINARY RETENTION: Primary | ICD-10-CM

## 2021-08-27 DIAGNOSIS — R33.8 BENIGN PROSTATIC HYPERPLASIA WITH URINARY RETENTION: Primary | ICD-10-CM

## 2021-08-27 PROCEDURE — 52442 CYSTO INS TRNSPRSTC IMPLT EA: CPT | Performed by: UROLOGY

## 2021-08-27 PROCEDURE — L8699 PROSTHETIC IMPLANT NOS: HCPCS | Performed by: UROLOGY

## 2021-08-27 PROCEDURE — NC001 PR NO CHARGE: Performed by: UROLOGY

## 2021-08-27 PROCEDURE — 52441 CYSTO INSJ TRNSPRSTC 1 IMPLT: CPT | Performed by: UROLOGY

## 2021-08-27 DEVICE — IMPLANT URO PROSTATE UROLIFT: Type: IMPLANTABLE DEVICE | Site: URETHRA | Status: FUNCTIONAL

## 2021-08-27 RX ORDER — ONDANSETRON 2 MG/ML
4 INJECTION INTRAMUSCULAR; INTRAVENOUS ONCE AS NEEDED
Status: DISCONTINUED | OUTPATIENT
Start: 2021-08-27 | End: 2021-08-27 | Stop reason: HOSPADM

## 2021-08-27 RX ORDER — FENTANYL CITRATE 50 UG/ML
INJECTION, SOLUTION INTRAMUSCULAR; INTRAVENOUS AS NEEDED
Status: DISCONTINUED | OUTPATIENT
Start: 2021-08-27 | End: 2021-08-27

## 2021-08-27 RX ORDER — SODIUM CHLORIDE, SODIUM LACTATE, POTASSIUM CHLORIDE, CALCIUM CHLORIDE 600; 310; 30; 20 MG/100ML; MG/100ML; MG/100ML; MG/100ML
INJECTION, SOLUTION INTRAVENOUS CONTINUOUS PRN
Status: DISCONTINUED | OUTPATIENT
Start: 2021-08-27 | End: 2021-08-27

## 2021-08-27 RX ORDER — CEFAZOLIN SODIUM 2 G/50ML
2000 SOLUTION INTRAVENOUS ONCE
Status: COMPLETED | OUTPATIENT
Start: 2021-08-27 | End: 2021-08-27

## 2021-08-27 RX ORDER — PROPOFOL 10 MG/ML
INJECTION, EMULSION INTRAVENOUS AS NEEDED
Status: DISCONTINUED | OUTPATIENT
Start: 2021-08-27 | End: 2021-08-27

## 2021-08-27 RX ORDER — PROPOFOL 10 MG/ML
INJECTION, EMULSION INTRAVENOUS CONTINUOUS PRN
Status: DISCONTINUED | OUTPATIENT
Start: 2021-08-27 | End: 2021-08-27

## 2021-08-27 RX ORDER — ACETAMINOPHEN 325 MG/1
650 TABLET ORAL EVERY 4 HOURS PRN
Qty: 30 TABLET | Refills: 0
Start: 2021-08-27

## 2021-08-27 RX ORDER — OXYCODONE HYDROCHLORIDE 5 MG/1
5 TABLET ORAL EVERY 4 HOURS PRN
Status: DISCONTINUED | OUTPATIENT
Start: 2021-08-27 | End: 2021-08-27 | Stop reason: HOSPADM

## 2021-08-27 RX ORDER — NAPROXEN 500 MG/1
500 TABLET ORAL 2 TIMES DAILY WITH MEALS
Qty: 10 TABLET | Refills: 0 | Status: SHIPPED | OUTPATIENT
Start: 2021-08-27 | End: 2021-09-01

## 2021-08-27 RX ORDER — MAGNESIUM HYDROXIDE 1200 MG/15ML
LIQUID ORAL AS NEEDED
Status: DISCONTINUED | OUTPATIENT
Start: 2021-08-27 | End: 2021-08-27 | Stop reason: HOSPADM

## 2021-08-27 RX ORDER — DOCUSATE SODIUM 100 MG/1
100 CAPSULE, LIQUID FILLED ORAL 2 TIMES DAILY
Qty: 30 CAPSULE | Refills: 0 | Status: SHIPPED | OUTPATIENT
Start: 2021-08-27 | End: 2021-09-11

## 2021-08-27 RX ORDER — PHENAZOPYRIDINE HYDROCHLORIDE 200 MG/1
200 TABLET, FILM COATED ORAL 3 TIMES DAILY PRN
Qty: 10 TABLET | Refills: 0 | Status: SHIPPED | OUTPATIENT
Start: 2021-08-27 | End: 2021-08-30

## 2021-08-27 RX ORDER — FENTANYL CITRATE/PF 50 MCG/ML
25 SYRINGE (ML) INJECTION
Status: COMPLETED | OUTPATIENT
Start: 2021-08-27 | End: 2021-08-27

## 2021-08-27 RX ORDER — MIDAZOLAM HYDROCHLORIDE 2 MG/2ML
INJECTION, SOLUTION INTRAMUSCULAR; INTRAVENOUS AS NEEDED
Status: DISCONTINUED | OUTPATIENT
Start: 2021-08-27 | End: 2021-08-27

## 2021-08-27 RX ADMIN — MIDAZOLAM HYDROCHLORIDE 1 MG: 1 INJECTION, SOLUTION INTRAMUSCULAR; INTRAVENOUS at 09:11

## 2021-08-27 RX ADMIN — PROPOFOL 50 MG: 10 INJECTION, EMULSION INTRAVENOUS at 09:12

## 2021-08-27 RX ADMIN — FENTANYL CITRATE 25 MCG: 50 INJECTION INTRAMUSCULAR; INTRAVENOUS at 10:28

## 2021-08-27 RX ADMIN — FENTANYL CITRATE 25 MCG: 50 INJECTION, SOLUTION INTRAMUSCULAR; INTRAVENOUS at 09:11

## 2021-08-27 RX ADMIN — FENTANYL CITRATE 25 MCG: 50 INJECTION, SOLUTION INTRAMUSCULAR; INTRAVENOUS at 09:34

## 2021-08-27 RX ADMIN — FENTANYL CITRATE 25 MCG: 50 INJECTION INTRAMUSCULAR; INTRAVENOUS at 10:15

## 2021-08-27 RX ADMIN — SODIUM CHLORIDE, SODIUM LACTATE, POTASSIUM CHLORIDE, AND CALCIUM CHLORIDE: .6; .31; .03; .02 INJECTION, SOLUTION INTRAVENOUS at 08:41

## 2021-08-27 RX ADMIN — PROPOFOL 120 MCG/KG/MIN: 10 INJECTION, EMULSION INTRAVENOUS at 09:12

## 2021-08-27 RX ADMIN — CEFAZOLIN SODIUM 2000 MG: 2 SOLUTION INTRAVENOUS at 09:06

## 2021-08-27 RX ADMIN — MIDAZOLAM HYDROCHLORIDE 1 MG: 1 INJECTION, SOLUTION INTRAMUSCULAR; INTRAVENOUS at 09:06

## 2021-08-27 NOTE — OP NOTE
Operative Note     PATIENT:  Rachel Aponte (MRN [id number])    DATE OF PROCEDURE:   8/27/2021    PRE-OP DIAGNOSES:   1) BPH with obstruction  2) bulbar urethral stricture  3  Urinary retention managed with intermittent self catheterization     POST-OP DIAGNOSES AND OPERATIVE FINDINGS:   1) BPH with obstruction  2) bulbar urethral stricture  3  Urinary retention managed with intermittent self catheterization    PROCEDURES:  1) UroLift implantation    SURGEON:   Saud Dyer MD    No qualified teaching residents available to assist    ANESTHESIA TYPE:  IV Sedation    ESTIMATED BLOOD LOSS:   Minimal    COMPLICATIONS:   None    ANTIBIOTICS:  Cefazolin     INTRAOPERATIVE THROMBOEMBOLISM PROPHYLAXIS:  Pneumatic compression stockings    PROCEDURE SUMMARY:    The patient was identified, brought to the operating room, and placed on the table in supine position  After induction of general anesthesia, the patient was placed in dorsal lithotomy position and prepped and draped in the usual sterile fashion  A complete formal timeout was performed  A 20F cystoscope was inserted into the bladder  The urethral meatus seems somewhat stenotic and I began the procedure by gently dilating this using sounds  There was a bulbar urethral stricture present more proximally however was rather open and I was able to advance the 20 Bruneian endoscope without the need for any formal incision or dilation maneuvers  The cystoscopy bridge was replaced with a UroLift delivery device  The first treatment site was the patient's left side approximately 1 5 cm distal to the bladder neck  The distal tip of the delivery device was then angled laterally approximately 20 degrees at this position to compress the lateral lobe  The trigger was pulled, thereby deploying a needle containing the implant through the prostate  The needle was then retracted, allowing one end of the implant to be delivered to the capsular surface of the prostate  The implant was then tensioned to assure capsular seating and removal of slack monofilament  The device was then angled back toward midline and slowly advanced proximally until cystoscopic verification of the monofilament being centered in the delivery bay  The urethral end piece was then affixed to the monofilament thereby tailoring the size of the implant  Excess filament was then severed  The delivery device was then re-advanced into the bladder  The delivery device was then replaced with cystoscope and bridge and the implant location and opening effect was confirmed cystoscopically  The same procedure was then repeated on the right side, and two additional implants were delivered just proximal to the veru montanum, again one on left and one on right side of the prostate, following the same technique  A total of 6 implants were fired and 5 were successfully deployed to create a nice anterior channel  One misfired occur due to capsular pull-through  Implants were deployed in the following locations:      1  Right bladder neck  2  Left bladder neck  3  Right mid gland/apex  4 ,5   Left mid gland/apex X 2    A final cystoscopy was conducted first to inspect the location and state of each implant and second, to confirm the presence of a continuous anterior channel was present through the prostatic urethra with irrigation flow turned off  A kohler catheter was then placed  The patient tolerated the procedure well and was transferred to the recovery room awake alert and in stable condition  IMPLANTS:   Implant Name Type Inv   Item Serial No   Lot No  LRB No  Used Action   IMPLANT URO PROSTATE UROLIFT - [id number]  IMPLANT URO PROSTATE UROLIFT  [employer] [id number] N/A 7 Implanted        PLAN:    Patient will be discharged home with Kohler catheter 24 hours

## 2021-08-27 NOTE — H&P
UROLOGY HISTORY AND PHYSICAL     Patient Identifiers: Tad Godoy (MRN [id number])      Date of Service: 8/27/2021        ASSESSMENT:     70 y o  old male with  CIC dependent urinary retention, BPH presents for Urolif     The risks of Urolift include but are not limited to bleeding, infection, reaction to anesthesia such as heart attack, stroke, DVT/PE, hyponatremia, bladder neck contracture, urethral stricture, injury to surrounding structures (ureters, rectum, etc)  We discussed that additional risks of trans urethral resection procedures such as incontinence, retrograde ejaculation, and erectile dysfunction have been only rarely reported with the Uro lift procedure  We did discuss that this is a new were procedure and a permanent implant  We discussed that there may be some long-term implications that her on for seen with this newer technology, such as perhaps complicating treatment for prostate cancer if indicated down the line  Finally, I told him that he may require additional procedures secondary to some of these complications  PLAN:     To OR for Uro lift      History of Present Illness:     Tad Godoy is a 70 y o  old with a history of CIC dependent urinary retention and BPH    Past Medical, Past Surgical History:     Past Medical History:   Diagnosis Date    Asthma     Benign prostatic hyperplasia     Hypercholesteremia     Hypertension     Urinary retention    :    Past Surgical History:   Procedure Laterality Date    COLONOSCOPY      CYSTOSCOPY      HERNIA REPAIR      KNEE ARTHROPLASTY      KNEE SURGERY      left    TOE SURGERY     :    Medications, Allergies:   No current facility-administered medications for this encounter  Allergies:   Allergies   Allergen Reactions    Dust Mite Extract Itching    Mixed Ragweed Itching    Molds & Smuts Itching   :    Social and Family History:   Social History:   Social History     Tobacco Use    Smoking status: Current Every Day Smoker     Types: Cigars    Smokeless tobacco: Current User   Vaping Use    Vaping Use: Never assessed   Substance Use Topics    Alcohol use: Yes     Alcohol/week: 2 0 standard drinks     Types: 2 Shots of liquor per week     Comment: daily    Drug use: Never     Social History     Tobacco Use   Smoking Status Current Every Day Smoker    Types: Cigars   Smokeless Tobacco Current User       Family History:  History reviewed  No pertinent family history :     Review of Systems:     General: Fever, chills, or night sweats: negative  Cardiac: Negative for chest pain  Pulmonary: Negative for shortness of breath  Gastrointestinal: Abdominal pain negative  Nausea, vomiting, or diarrhea negative  Genitourinary: See HPI above  Patient does nothave hematuria  All other systems queried were negative  Physical Exam:   General: Patient is pleasant and in NAD  Awake and alert  Ht 5' 9" (1 753 m)   Wt 107 kg (236 lb)   BMI 34 85 kg/m²   HEENT:  Normocephalic atraumatic  Cardiac:  Regular rate and rhythm, Peripheral edema: negative  Pulmonary: Non-labored breathing, CTAB  Abdomen: Soft, non-tender, non-distended  No surgical scars  No masses, tenderness, hernias noted  Genitourinary: negative CVA tenderness, neg suprapubic tenderness  Extremities: normal movement in all 4       Labs:   No results found for: HGB, HCT, WBC, PLT]    No results found for: NA, K, CL, CO2, BUN, CREATININE, CALCIUM, GLUCOSE]    Imaging:   I personally reviewed the images and report of the following studies, and reviewed them with the patient:        Thank you for allowing me to participate in this patients care  Please do not hesitate to call with any additional questions    Nathaniel Michel MD

## 2021-08-27 NOTE — ANESTHESIA POSTPROCEDURE EVALUATION
Post-Op Assessment Note    CV Status:  Stable  Pain Score: 0    Pain management: adequate     Mental Status:  Alert and awake   Hydration Status:  Euvolemic   PONV Controlled:  Controlled   Airway Patency:  Patent      Post Op Vitals Reviewed: Yes      Staff: CRNA         No complications documented      /62 (08/27/21 0946)    Temp 97 9 °F (36 6 °C) (08/27/21 0946)    Pulse 74 (08/27/21 0946)   Resp 16 (08/27/21 0946)    SpO2   97

## 2021-08-27 NOTE — DISCHARGE INSTRUCTIONS
Dutton Catheter Placement and Care   WHAT YOU NEED TO KNOW:   A Dutton catheter is a sterile tube that is inserted into your bladder to drain urine  It is also called an indwelling urinary catheter  DISCHARGE INSTRUCTIONS:   Seek care immediately if:   Your catheter comes out  You suddenly have material that looks like sand in the tubing or drainage bag  No urine is draining into the bag and you have checked the system  You have pain in your hip, back, pelvis, or lower abdomen  You are confused or cannot think clearly  Call your doctor or urologist if:   You have a fever  You have bladder spasms for more than 1 day after the catheter is placed  You see blood in the tubing or drainage bag  You have a rash or itching where the catheter tube is secured to your skin  Urine leaks from or around the catheter, tubing, or drainage bag  The closed drainage system has accidently come open or apart  You see a layer of crystals inside the tubing  You have questions or concerns about your condition or care  Care for your catheter and drainage bag: You can reduce your risk for infection and injury by caring for your catheter and drainage bag properly  Wash your hands often  Wash before and after you touch your catheter, tubing, or drainage bag  Use soap and water  Wear clean disposable gloves when you care for your catheter or disconnect the drainage bag  Wash your hands before you prepare or eat food  Clean your genital area 2 times every day  Clean your catheter area and anal opening after every bowel movement  For men:  Use a soapy cloth to clean the tip of your penis  Start where the catheter enters  Wipe backward making sure to pull back the foreskin  Then use a cloth with clear water in the same direction to clean away the soap  For women:  Use a soapy cloth to clean the area that the catheter enters your body   Make sure to separate your labia and wipe toward the anus  Then use a cloth with clear water and wipe in the same direction  Secure the catheter tube  so you do not pull or move the catheter  This helps prevent pain and bladder spasms  Healthcare providers will show you how to use medical tape or a strap to secure the catheter tube to your body  Keep a closed drainage system  Your catheter should always be attached to the drainage bag to form a closed system  Do not disconnect any part of the closed system unless you need to change the bag  Keep the drainage bag below the level of your waist   This helps stop urine from moving back up the tubing and into your bladder  Do not loop or kink the tubing  This can cause urine to back up and collect in your bladder  Do not let the drainage bag touch or lie on the floor  Empty the drainage bag when needed  The weight of a full drainage bag can be painful  Empty the drainage bag every 3 to 6 hours or when it is ? full  Clean and change the drainage bag as directed  Ask your healthcare provider how often you should change the drainage bag and what cleaning solution to use  Wear disposable gloves when you change the bag  Do not allow the end of the catheter or tubing to touch anything  Clean the ends with an alcohol pad before you reconnect them  What to do if problems develop:   No urine is draining into the bag:      Check for kinks in the tubing and straighten them out  Check the tape or strap used to secure the catheter tube to your skin  Make sure it is not blocking the tube  Make sure you are not sitting or lying on the tubing  Make sure the urine bag is hanging below the level of your waist     Urine leaks from or around the catheter, tubing, or drainage bag:  Check if the closed drainage system has accidently come open or apart  Clean the catheter and tubing ends with a new alcohol pad and reconnect them      Follow up with your doctor or urologist as directed:  Write down your questions so you remember to ask them during your visits  © Copyright "[employer] 2021 Information is for End User's use only and may not be sold, redistributed or otherwise used for commercial purposes  All illustrations and images included in CareNotes® are the copyrighted property of A D A M , Inc  or [employer]  The above information is an  only  It is not intended as medical advice for individual conditions or treatments  Talk to your doctor, nurse or pharmacist before following any medical regimen to see if it is safe and effective for you  Urinary Leg Bag   Kristy SM : Renal and Urinary Disorders  In: Robin Manual of Nursing Practice, 10th ed  [address] [facility] [place], [place], 2013  Mary Jane Bland JA: Indwelling Urinary Catheter Care and Removal  In: Robin's Nursing Procedures, 6th ed  [address], [place], 2013  Urology [facility]: Bladder control problems: managing with products and devices  [facility]  Anant Garcia MD  2013  Available from URL: Geneva ramirez [email or web address]  As accessed 2014-04-16  Kamari JEWELL: Selecting the right urinary leg bag drainage system for patient needs  Br J Nurs 2011; 20(13):797-798, [phone]  Mary Jane Lamas: Renal and urologic care  In: Best Practices: Evidence-Based Nursing Procedures, 2nd ed  [address], [place], 2007  © Copyright "[employer] 2021 Information is for End User's use only and may not be sold, redistributed or otherwise used for commercial purposes  All illustrations and images included in CareNotes® are the copyrighted property of A D [employer] M , Inc  or [employer]  The above information is an  only  It is not intended as medical advice for individual conditions or treatments  Talk to your doctor, nurse or pharmacist before following any medical regimen to see if it is safe and effective for you    Mr Mamei De Guzman :    Your surgery went very well  I was able to open a nice, wide, more natural channel within your prostate by placing 6 Uro lift implants  Please take your medications as needed for discomfort over the next few days  Most importantly please drink 6-8 glasses water per day  Please remove your catheter at home tomorrow as instructed  You may resume regular self catheterization as needed at that time  I do recommend continuing catheter once at night especially to help keep your urethral stricture open  Your prostate has been opened significantly with the procedure today    Please call with any questions or concerns  Eli Grove MD,PhD  [address] [facility]  [phone]          UROLIFT      WHAT YOU NEED TO KNOW:   A UroLift is procedure that is done to open the natural channel within your prostate gland  DISCHARGE INSTRUCTIONS:   Medicines:   · Pain medicine: You may be given a prescription medicine to decrease pain  Do not wait until the pain is severe before you take these medicines:  · Tylenol (over the counter) - please take this as directed on the bottle for routine pain  · Naproxen (prescription-strength NSAID/Ibuprofen type medicine) - for moderate pain  This can be substituted with over the counter Ibuprofen if more convenient, or it this is less expensive  · Pyridium - to minimize pain and discomfort when passing your urine  Will turn your urine orange  This can be substituted with over the counter "AZO" if more convenient, or it this is less expensive  · Colace - to prevent constipation    This is also an over the counter medicine - you can purchase it without a prescription, if more convenient or less expensive  · If your pain is not well controlled using Tylenol, Naprosyn/ibuprofen please do not hesitate to call our office, you will be connected to our care team day or night and you can be issued a prescription for a narcotic pain medication    · Antibiotics:  You may be provided with antibiotics at discharge, particularly if you are being sent home with a kohler catheter     This medicine is given to fight or prevent an infection caused by bacteria  Always take your antibiotics exactly as ordered by your healthcare provider  Do not stop taking your medicine unless directed by your healthcare provider  Never save antibiotics or take leftover antibiotics that were given to you for another illness  · Take your medicine as directed  Contact your healthcare provider if you think your medicine is not helping or if you have side effects  Tell him or her if you are allergic to any medicine  Keep a list of the medicines, vitamins, and herbs you take  Include the amounts, and when and why you take them  Bring the list or the pill bottles to follow-up visits  Carry your medicine list with you in case of an emergency  Follow up with your healthcare provider or urologist as directed: You may need to return to make sure you do not have an infection, or to have your Kohler catheter removed  Write down your questions so you remember to ask them during your visits  Kohler catheter care: You may be sent home with a Kohler catheter  If so you will be provided with instructions to remove it    · Please drink plenty of fluids  Blood in your urine is normal for few days following the procedure  Keeping well-hydrated will prevent any trouble from this    Contact your healthcare provider or urologist if:   · You have a fever  · You have new or more blood in your urine  · You have trouble starting to urinate, or have a weak stream of urine when you urinate  · You feel like you have a full bladder, even after you urinate  You may also leak urine  · You often wake up during the night to urinate  You may also feel the need to urinate right away  · You feel pain and burning when you urinate      · You feel pain or pressure in your lower abdomen  · Your urine looks cloudy, and smells bad  · You have trouble getting an erection or ejaculating  · You have questions or concerns about your condition or care  Seek care immediately or call 911 if:   · You urinate little or not at all  · You have severe abdominal or back pain  · You are dizzy or confused  · You have abdominal pain, nausea, and vomiting  · Your heartbeat is slower than usual   © 2017 [address] [facility] Information is for End User's use only and may not be sold, redistributed or otherwise used for commercial purposes  All illustrations and images included in CareNotes® are the copyrighted property of A D A M , Inc  or Austyn Karlos  The above information is an  only  It is not intended as medical advice for individual conditions or treatments  Talk to your doctor, nurse or pharmacist before following any medical regimen to see if it is safe and effective for you  Kohler Catheter Removal after [address] [place] [address] Urology [phone]    A Kohler catheter is a sterile tube that is inserted into your bladder to drain urine  It is also called an indwelling urinary catheter  The tip of the catheter has a small balloon filled with solution that holds the catheter inside your bladder  This can be removed at home if you prefer; we provide necessary supplies and ensure proper teaching  Or this can be removed in the office by a nurse or healthcare worker, whichever you prefer  Antibiotics may be given to be taken before and after catheter removal to prevent infection  Directions for Kohler Catheter removal at home:   Empty any urine out of drainage bag  [facility] your hands with soap and water  You may then wear gloves for the procedure if preferred   Put the syringe into the balloon port of the kohler catheter   (This is the part not attached to the drainage bag and generally is smaller with a colored portion around it ) Push and twist to make sure the syringe is in proper position  Pull back on plunger to draw water out of the balloon  Detach syringe, empty water into cup or emesis basin  Repeat process of using syringe to empty water from balloon a few more times to ensure balloon is completely empty   You may want to stand or sit in shower/bathtub to remove catheter as urine may drip out when you remove it  Slowly but steadily pull catheter out  Catheter may need to be turned in a [place] if it at first feels 'stuck ' If catheter does not come out when you pull gently, stop pulling and call the urology office  After kohler catheter removal:   You may be asked to drink plenty of water to ensure hydration and to flush out lower urinary tract   Sometimes we have a second visit in the afternoon scheduled to ensure you are able to empty your bladder appropriately  This will be scheduled with kohler catheter removal visit if necessary   Normal symptoms after kohler catheter removal include urinary urgency, urinary frequency, burning with urination and some blood in urine  These can be normal for 24-48 hours after removal  If these symptoms persist longer than two days, please call the urology office   If you are unable to urinate 8 hours after removal and are uncomfortable or if you develop a fever please call urology office   Follow up as directed by urology office  This follow up will usually be scheduled prior to the surgery or during nurse post operative phone call

## 2021-08-30 ENCOUNTER — HOSPITAL ENCOUNTER (EMERGENCY)
Facility: HOSPITAL | Age: 76
Discharge: HOME | End: 2021-08-30
Attending: EMERGENCY MEDICINE
Payer: MEDICARE

## 2021-08-30 ENCOUNTER — NURSE TRIAGE (OUTPATIENT)
Dept: OTHER | Facility: OTHER | Age: 76
End: 2021-08-30

## 2021-08-30 VITALS
OXYGEN SATURATION: 97 % | TEMPERATURE: 98.1 F | DIASTOLIC BLOOD PRESSURE: 67 MMHG | HEART RATE: 60 BPM | RESPIRATION RATE: 16 BRPM | SYSTOLIC BLOOD PRESSURE: 138 MMHG

## 2021-08-30 DIAGNOSIS — R35.0 URINARY FREQUENCY: ICD-10-CM

## 2021-08-30 DIAGNOSIS — R33.9 URINARY RETENTION: Primary | ICD-10-CM

## 2021-08-30 DIAGNOSIS — R31.9 HEMATURIA: ICD-10-CM

## 2021-08-30 LAB
ALBUMIN SERPL BCP-MCNC: 3.5 G/DL (ref 3.5–5)
ALP SERPL-CCNC: 86 U/L (ref 46–116)
ALT SERPL W P-5'-P-CCNC: 30 U/L (ref 12–78)
ANION GAP SERPL CALCULATED.3IONS-SCNC: 8 MMOL/L (ref 4–13)
AST SERPL W P-5'-P-CCNC: 25 U/L (ref 5–45)
BACTERIA UR QL AUTO: ABNORMAL /HPF
BASOPHILS # BLD AUTO: 0.03 THOUSANDS/ΜL (ref 0–0.1)
BASOPHILS NFR BLD AUTO: 1 % (ref 0–1)
BILIRUB SERPL-MCNC: 0.34 MG/DL (ref 0.2–1)
BILIRUB UR QL STRIP: NEGATIVE
BUN SERPL-MCNC: 18 MG/DL (ref 5–25)
CALCIUM SERPL-MCNC: 9.6 MG/DL (ref 8.3–10.1)
CHLORIDE SERPL-SCNC: 105 MMOL/L (ref 100–108)
CLARITY UR: ABNORMAL
CO2 SERPL-SCNC: 28 MMOL/L (ref 21–32)
COLOR UR: ABNORMAL
CREAT SERPL-MCNC: 1.1 MG/DL (ref 0.6–1.3)
EOSINOPHIL # BLD AUTO: 0.08 THOUSAND/ΜL (ref 0–0.61)
EOSINOPHIL NFR BLD AUTO: 1 % (ref 0–6)
ERYTHROCYTE [DISTWIDTH] IN BLOOD BY AUTOMATED COUNT: 12.8 % (ref 11.6–15.1)
GFR SERPL CREATININE-BSD FRML MDRD: 66 ML/MIN/1.73SQ M
GLUCOSE SERPL-MCNC: 101 MG/DL (ref 65–140)
GLUCOSE UR STRIP-MCNC: NEGATIVE MG/DL
HCT VFR BLD AUTO: 40.5 % (ref 36.5–49.3)
HGB BLD-MCNC: 13.5 G/DL (ref 12–17)
HGB UR QL STRIP.AUTO: ABNORMAL
IMM GRANULOCYTES # BLD AUTO: 0.02 THOUSAND/UL (ref 0–0.2)
IMM GRANULOCYTES NFR BLD AUTO: 0 % (ref 0–2)
KETONES UR STRIP-MCNC: NEGATIVE MG/DL
LEUKOCYTE ESTERASE UR QL STRIP: ABNORMAL
LYMPHOCYTES # BLD AUTO: 1.63 THOUSANDS/ΜL (ref 0.6–4.47)
LYMPHOCYTES NFR BLD AUTO: 29 % (ref 14–44)
MCH RBC QN AUTO: 31.4 PG (ref 26.8–34.3)
MCHC RBC AUTO-ENTMCNC: 33.3 G/DL (ref 31.4–37.4)
MCV RBC AUTO: 94 FL (ref 82–98)
MONOCYTES # BLD AUTO: 0.34 THOUSAND/ΜL (ref 0.17–1.22)
MONOCYTES NFR BLD AUTO: 6 % (ref 4–12)
NEUTROPHILS # BLD AUTO: 3.47 THOUSANDS/ΜL (ref 1.85–7.62)
NEUTS SEG NFR BLD AUTO: 63 % (ref 43–75)
NITRITE UR QL STRIP: NEGATIVE
NON-SQ EPI CELLS URNS QL MICRO: ABNORMAL /HPF
NRBC BLD AUTO-RTO: 0 /100 WBCS
PH UR STRIP.AUTO: 6 [PH]
PLATELET # BLD AUTO: 249 THOUSANDS/UL (ref 149–390)
PMV BLD AUTO: 10.4 FL (ref 8.9–12.7)
POTASSIUM SERPL-SCNC: 3.8 MMOL/L (ref 3.5–5.3)
PROT SERPL-MCNC: 7.6 G/DL (ref 6.4–8.2)
PROT UR STRIP-MCNC: NEGATIVE MG/DL
RBC # BLD AUTO: 4.3 MILLION/UL (ref 3.88–5.62)
RBC #/AREA URNS AUTO: ABNORMAL /HPF
SODIUM SERPL-SCNC: 141 MMOL/L (ref 136–145)
SP GR UR STRIP.AUTO: 1.01 (ref 1–1.03)
UROBILINOGEN UR QL STRIP.AUTO: 0.2 E.U./DL
WBC # BLD AUTO: 5.57 THOUSAND/UL (ref 4.31–10.16)
WBC #/AREA URNS AUTO: ABNORMAL /HPF

## 2021-08-30 PROCEDURE — 36415 COLL VENOUS BLD VENIPUNCTURE: CPT

## 2021-08-30 PROCEDURE — 99284 EMERGENCY DEPT VISIT MOD MDM: CPT | Performed by: EMERGENCY MEDICINE

## 2021-08-30 PROCEDURE — 85025 COMPLETE CBC W/AUTO DIFF WBC: CPT | Performed by: EMERGENCY MEDICINE

## 2021-08-30 PROCEDURE — 99283 EMERGENCY DEPT VISIT LOW MDM: CPT

## 2021-08-30 PROCEDURE — 51798 US URINE CAPACITY MEASURE: CPT

## 2021-08-30 PROCEDURE — 1124F ACP DISCUSS-NO DSCNMKR DOCD: CPT | Performed by: EMERGENCY MEDICINE

## 2021-08-30 PROCEDURE — 80053 COMPREHEN METABOLIC PANEL: CPT | Performed by: EMERGENCY MEDICINE

## 2021-08-30 PROCEDURE — 81001 URINALYSIS AUTO W/SCOPE: CPT | Performed by: EMERGENCY MEDICINE

## 2021-08-30 NOTE — ED ATTENDING ATTESTATION
8/30/2021  Haydee KEARNEY DO, saw and evaluated the patient  I have discussed the patient with the resident/non-physician practitioner and agree with the resident's/non-physician practitioner's findings, Plan of Care, and MDM as documented in the resident's/non-physician practitioner's note, except where noted  All available labs and Radiology studies were reviewed  I was present for key portions of any procedure(s) performed by the resident/non-physician practitioner and I was immediately available to provide assistance  At this point I agree with the current assessment done in the Emergency Department  I have conducted an independent evaluation of this patient a history and physical is as follows:    A 79-year-old male with hematuria, urinary frequency and urgency, status post uro lift procedure performed by Urology on Friday, 3 days ago  He was instructed to remove his Kohler catheter he states on Saturday  He self caths himself daily and has continued to do so  However, he has continued to have urinary frequency with hematuria and clots  Called his urology office and they recommended he come to the hospital   He is currently on day 6 of Bactrim treatment for UTI, grew out Klebsiella pneumonia that is sensitive to Bactrim  He denies any fevers or chills  On physical exam: pt very well appearing, in NAD  mucous membranes moist   CTA b/l , heart RRR  Abdomen NT, ND, no R/R/G  No bladder tenderness or distention  Normal bowel sounds  Neuro intact, gcs 15  Cap refill < 2 sec, skin warm and dry  Initial bladder scan pre void was 189, postvoid was 30-40  Will discharge home, d/w pt about possible kohler cath but will hold off at this time  ED Course  ED Course as of Aug 30 1724   Mon Aug 30, 2021   1702 Patient's postvoid residual is 30-40 cc  He is able to urinate adequately, does not have any signs of residual urinary tract infection, taking Bactrim    Will discharge home with urology follow-up              Critical Care Time  Procedures

## 2021-08-30 NOTE — TELEPHONE ENCOUNTER
Reason for Disposition   Bloody or red-colored urine and prostate or bladder surgery > 3 days (72 hours) ago    Answer Assessment - Initial Assessment Questions  1  SYMPTOMS: "What symptoms are you concerned about?"      Hematuria  Urinary frequency  Urinating every 30 minutes on Saturday  Difficulty starting urine stream after catheter was removed  Starting passing clots that are dime to quarter sized  Incontinence, so wearing Depends  2  ONSET:  "When did the symptoms start?"      Started on 8/28/21 after catheter was removed  3  FEVER: "Is there a fever?" If so, ask: "What is the temperature, how was it measured, and when did it start?"      Denied feeling feverish  4  ABDOMINAL PAIN: "Is there any abdominal pain?" (e g , Scale 1-10; or mild, moderate, severe)      Denied abdominal pain  Discomfort due to frequency  5  URINE COLOR: "What color is the urine?"  "Is there blood present in the urine?" (e g , clear, yellow, cloudy, tea-colored, blood streaks, bright red)      Light yellow with blood clots  6  ONSET: "When was the catheter inserted?"      Dutton catheter post-op was removed after 24 hours (1500 on 8/28/2021)  7  OTHER SYMPTOMS: "Do you have any other symptoms?" (e g , back pain, bad urine odor)       Denied foul smelling urine or flank pain  Had a UTI prior to Urolift done on 8/27/2021  Bactrim, 100 mg, for 7 days prescribed on 8/25/2021      Protocols used: URINARY CATHETER SYMPTOMS AND QUESTIONS-ADULT-[place]

## 2021-08-30 NOTE — ED PROVIDER NOTES
History  Chief Complaint   Patient presents with    Difficulty Urinating     S/P urolift friday  Pt reports hx of urinary retention at the end of July  Removed kohler on saturday and has been increasing diffuclty urinating  69 yo M s/p Urolift procedure on 8/27  Pt had urinary retention secondary to urinary tract stricter and BPH requiring Urolift  Pt had history of UTI in June which was + for Klebsiella  Pt had another UTI 1 week prior to procedure also + for Klebsiella and sensitive to Bactrim  Pt on day 6 on Bactrim  Pt had kohler in place for 24 hours following procedure and removed it himself on 8/28  Since removal of Kohler, pt had passage of some clots and some hematuria  Pt has also been complaining on increased urgency and frequency with urinary dribbling since the procedure  Pt has been self-cathing at night  Pt denies fevers, abdominal distention and abdominal pain  Prior to Admission Medications   Prescriptions Last Dose Informant Patient Reported? Taking?    Ascorbic Acid (VITAMIN C) 100 MG tablet   Yes No   Sig: Take 100 mg by mouth daily   B Complex-C-Folic Acid (NEPHROCAPS PO)   Yes No   Sig: Take 1 capsule by mouth   Omega-3 Fatty Acids (fish oil) 1,000 mg   Yes No   Sig: Fish Oil   Silodosin 8 MG CAPS   Yes No   Sig: Take 8 mg by mouth   acetaminophen (TYLENOL) 325 mg tablet   No No   Sig: Take 2 tablets (650 mg total) by mouth every 4 (four) hours as needed for mild pain   albuterol (Ventolin HFA) 90 mcg/act inhaler   Yes No   Sig: Inhale 1 puff as needed    aspirin 81 MG tablet   Yes No   Sig: Take 81 mg by mouth daily   atorvastatin (LIPITOR) 10 mg tablet   Yes No   Sig: Take 10 mg by mouth   budesonide (Pulmicort Flexhaler) 90 MCG/ACT inhaler   Yes No   Sig: as needed    docusate sodium (COLACE) 100 mg capsule   No No   Sig: Take 1 capsule (100 mg total) by mouth 2 (two) times a day for 15 days   esomeprazole (NEXIUM) 40 mg packet   Yes No   Sig: Take 40 mg by mouth   finasteride (PROSCAR) 5 mg tablet   Yes No   Sig: Take 5 mg by mouth daily   fluticasone (Flovent HFA) 110 MCG/ACT inhaler   Yes No   Sig: Inhale 1 puff as needed    guaifenesin-codeine (GUAIFENESIN AC) 100-10 MG/5ML liquid   Yes No   Sig: Take 5 mL by mouth as needed    hydrochlorothiazide (HYDRODIURIL) 25 mg tablet   Yes No   Sig: Take 25 mg by mouth   metoprolol succinate (TOPROL-XL) 50 mg 24 hr tablet   Yes No   Sig: Take 50 mg by mouth   multivitamin (THERAGRAN) TABS   Yes No   Sig: Take 1 tablet by mouth daily   naproxen (NAPROSYN) 500 mg tablet   No No   Sig: Take 1 tablet (500 mg total) by mouth 2 (two) times a day with meals for 5 days For pain   phenazopyridine (PYRIDIUM) 200 mg tablet   No No   Sig: Take 1 tablet (200 mg total) by mouth 3 (three) times a day as needed (Pain with urination) for up to 3 days   sulfamethoxazole-trimethoprim (BACTRIM DS) 800-160 mg per tablet   No No   Sig: Take 1 tablet by mouth 2 (two) times a day for 7 days      Facility-Administered Medications: None       Past Medical History:   Diagnosis Date    Asthma     Benign prostatic hyperplasia     Hypercholesteremia     Hypertension     Urinary retention        Past Surgical History:   Procedure Laterality Date    COLONOSCOPY      CYSTOSCOPY      HERNIA REPAIR      KNEE ARTHROPLASTY      KNEE SURGERY      left    TOE SURGERY         History reviewed  No pertinent family history  I have reviewed and agree with the history as documented  E-Cigarette/Vaping     E-Cigarette/Vaping Substances    Nicotine No     THC No     CBD No     Flavoring No     Other No     Unknown No      Social History     Tobacco Use    Smoking status: Current Every Day Smoker     Types: Cigars    Smokeless tobacco: Current User   Vaping Use    Vaping Use: Never assessed   Substance Use Topics    Alcohol use:  Yes     Alcohol/week: 2 0 standard drinks     Types: 2 Shots of liquor per week     Comment: daily    Drug use: Never        Review of Systems   Constitutional: Negative for chills and fever  HENT: Negative for ear pain and sore throat  Eyes: Negative for pain and visual disturbance  Respiratory: Negative for cough and shortness of breath  Cardiovascular: Negative for chest pain and palpitations  Gastrointestinal: Negative for abdominal distention, abdominal pain, diarrhea, nausea and vomiting  Genitourinary: Positive for difficulty urinating, frequency, hematuria and urgency  Negative for dysuria  Musculoskeletal: Negative for arthralgias and back pain  Skin: Negative for color change and rash  Neurological: Negative for seizures and syncope  All other systems reviewed and are negative  Physical Exam  ED Triage Vitals [08/30/21 1341]   Temperature Pulse Respirations Blood Pressure SpO2   98 1 °F (36 7 °C) 63 16 147/75 99 %      Temp Source Heart Rate Source Patient Position - Orthostatic VS BP Location FiO2 (%)   Oral Monitor Sitting Left arm --      Pain Score       --             Orthostatic Vital Signs  Vitals:    08/30/21 1341 08/30/21 1645 08/30/21 1646   BP: 147/75 138/67 138/67   Pulse: 63  60   Patient Position - Orthostatic VS: Sitting  Sitting       Physical Exam  Vitals and nursing note reviewed  Constitutional:       General: He is not in acute distress  Appearance: Normal appearance  He is well-developed  HENT:      Head: Normocephalic and atraumatic  Nose: Nose normal    Eyes:      Conjunctiva/sclera: Conjunctivae normal    Cardiovascular:      Rate and Rhythm: Normal rate and regular rhythm  Heart sounds: No murmur heard  Pulmonary:      Effort: Pulmonary effort is normal  No respiratory distress  Breath sounds: Normal breath sounds  Abdominal:      General: Abdomen is flat  There is no distension  Palpations: Abdomen is soft  Tenderness: There is no abdominal tenderness  Musculoskeletal:         General: Normal range of motion  Cervical back: Neck supple  Skin:     General: Skin is warm and dry  Neurological:      Mental Status: He is alert           ED Medications  Medications - No data to display    Diagnostic Studies  Results Reviewed     Procedure Component Value Units Date/Time    Urine Microscopic [[id number]]  (Abnormal) Collected: 08/30/21 1409    Lab Status: Final result Specimen: Urine, Other Updated: 08/30/21 1655     RBC, UA Innumerable /hpf      WBC, UA 0-1 /hpf      Epithelial Cells None Seen /hpf      Bacteria, UA None Seen /hpf     Comprehensive metabolic panel [[id number]] Collected: 08/30/21 1348    Lab Status: Final result Specimen: Blood from Arm, Right Updated: 08/30/21 1426     Sodium 141 mmol/L      Potassium 3 8 mmol/L      Chloride 105 mmol/L      CO2 28 mmol/L      ANION GAP 8 mmol/L      BUN 18 mg/dL      Creatinine 1 10 mg/dL      Glucose 101 mg/dL      Calcium 9 6 mg/dL      AST 25 U/L      ALT 30 U/L      Alkaline Phosphatase 86 U/L      Total Protein 7 6 g/dL      Albumin 3 5 g/dL      Total Bilirubin 0 34 mg/dL      eGFR 66 ml/min/1 73sq m     Narrative:      [place] guidelines for Chronic Kidney Disease (CKD):     Stage 1 with normal or high GFR (GFR > 90 mL/min/1 73 square meters)    Stage 2 Mild CKD (GFR = 60-89 mL/min/1 73 square meters)    Stage 3A Moderate CKD (GFR = 45-59 mL/min/1 73 square meters)    Stage 3B Moderate CKD (GFR = 30-44 mL/min/1 73 square meters)    Stage 4 Severe CKD (GFR = 15-29 mL/min/1 73 square meters)    Stage 5 End Stage CKD (GFR <15 mL/min/1 73 square meters)  Note: GFR calculation is accurate only with a steady state creatinine    UA w Reflex to Microscopic w Reflex to Culture [[id number]]  (Abnormal) Collected: 08/30/21 1409    Lab Status: Final result Specimen: Urine, Other Updated: 08/30/21 1417     Color, UA Light Yellow     Clarity, UA Cloudy     Specific Gravity, UA 1 015     pH, UA 6 0     Leukocytes, UA Trace     Nitrite, UA Negative     Protein, UA Negative mg/dl Glucose, UA Negative mg/dl      Ketones, UA Negative mg/dl      Urobilinogen, UA 0 2 E U /dl      Bilirubin, UA Negative     Blood, UA Large    CBC and differential [[id number]] Collected: 08/30/21 1348    Lab Status: Final result Specimen: Blood from Arm, Right Updated: 08/30/21 1402     WBC 5 57 Thousand/uL      RBC 4 30 Million/uL      Hemoglobin 13 5 g/dL      Hematocrit 40 5 %      MCV 94 fL      MCH 31 4 pg      MCHC 33 3 g/dL      RDW 12 8 %      MPV 10 4 fL      Platelets 551 Thousands/uL      nRBC 0 /100 WBCs      Neutrophils Relative 63 %      Immat GRANS % 0 %      Lymphocytes Relative 29 %      Monocytes Relative 6 %      Eosinophils Relative 1 %      Basophils Relative 1 %      Neutrophils Absolute 3 47 Thousands/µL      Immature Grans Absolute 0 02 Thousand/uL      Lymphocytes Absolute 1 63 Thousands/µL      Monocytes Absolute 0 34 Thousand/µL      Eosinophils Absolute 0 08 Thousand/µL      Basophils Absolute 0 03 Thousands/µL                  No orders to display         Procedures  Procedures      ED Course                                       MDM  Number of Diagnoses or Management Options  Hematuria: new and does not require workup  Urinary frequency: new and does not require workup  Urinary retention: new and does not require workup  Diagnosis management comments: 69 yo M s/p Urolift 3 days ago presenting with increased urgency, hematuria and dribbling with urination  Pt has no signs of abdominal tenderness or abdominal distention on exam  Bladder scan showed 189 ml, with post void residual 30-40 ml  Pt states he is able to void adequately and has no signs of acute urinary retention  Urine showed trace LE, likely secondary to resolving UTI  Urine culture + for Klebsiella and sensitive to Bactrim  Pt already on day 6 on Bactrim  Discussed return precautions  Recommended urology follow up in 2-3 days          Amount and/or Complexity of Data Reviewed  Clinical lab tests: ordered and reviewed        Disposition  Final diagnoses:   Urinary retention   Urinary frequency   Hematuria     Time reflects when diagnosis was documented in both MDM as applicable and the Disposition within this note     Time User Action Codes Description Comment    8/30/2021  5:03 PM Jes Karthik Add [R33 9] Urinary retention     8/30/2021  5:03 PM Jes Karthik Add [R35 0] Urinary frequency     8/30/2021  5:03 PM Jes Karthik Add [R31 9] Hematuria       ED Disposition     ED Disposition Condition Date/Time Comment    Discharge Stable Mon Aug 30, 2021  5:03 PM Mamie De Guzman  discharge to home/self care  Follow-up Information     Follow up With Specialties Details Why Contact Info Additional [facility], [address]  [phone]       [facility]   ALEJANDRINA Carrasco [address] [place] [address] Anastacia Vaughn [address] [id number]  [phone] [facility], [facility] Tricia Molina, [place], [address] [facility]          Patient's Medications   Discharge Prescriptions    No medications on file     No discharge procedures on file  PDMP Review     None           ED Provider  Attending physically available and evaluated Mamie De Guzman I managed the patient along with the ED Attending      Electronically Signed by         Vasu Lantigua MD  08/30/21 [id number]

## 2021-08-30 NOTE — TELEPHONE ENCOUNTER
Provider pool: Dr Jamie Ruano did a Urolift on 8/27/2021  Patient is now passing blood clots, frequency and he feels like he has a UTI  Currently taking Bactrim DS, BID for UTI diagnosed on 8/25/2021  See in office today disposition after triage, but no appointments are available  Please advise

## 2021-08-30 NOTE — TELEPHONE ENCOUNTER
Regarding: Possible UTI  ----- Message from Efren Orr sent at 8/30/2021  8:45 AM EDT -----  " I have blood in urine, frequent urination and I needed to wear depends, this is awful "

## 2021-08-30 NOTE — TELEPHONE ENCOUNTER
Office contacted patient and informed them of provider's recommendations  Patient stated that all the ER precautions symptoms they have had  Office instructed patient to proceed directly to the ER  The patient then stated that they had clots yesterday but none so far today  Patient verbalized that they would still proceed to ER as per precaution  Patient thanked the office for the call and the information

## 2021-08-30 NOTE — TELEPHONE ENCOUNTER
Urine culture from 8/23 positive for UTI, and susceptible to Bactrim  This is appropriate antibiotic  Patient should complete as prescribed  Encourage patient to increase water intake  If he develops symptoms of acute infection, difficulties urinating, worse gross hematuria/clots, he should proceed to ER

## 2021-08-31 ENCOUNTER — TELEPHONE (OUTPATIENT)
Dept: UROLOGY | Facility: CLINIC | Age: 76
End: 2021-08-31

## 2021-08-31 NOTE — TELEPHONE ENCOUNTER
Spoke with the patient  He is experiencing urgency frequency and urge urinary incontinence following Uro lift performed on Friday  Thankfully his hematuria has completely resolved  We reviewed a number of options my general recommendation was for continued time and conservative management  Patient self caths at night and I have advised him to hold off on any self cathing for the next few days to allow for resolution of edema, especially as he has had a negligible postvoid residual in the emergency department yesterday and on his last self cathing on Sunday  Expected the symptoms will resolve within the next few days    If not he was asked to call back, otherwise he will follow-up as scheduled

## 2021-10-06 ENCOUNTER — OFFICE VISIT (OUTPATIENT)
Dept: UROLOGY | Facility: CLINIC | Age: 76
End: 2021-10-06
Payer: MEDICARE

## 2021-10-06 VITALS
WEIGHT: 247 LBS | DIASTOLIC BLOOD PRESSURE: 76 MMHG | SYSTOLIC BLOOD PRESSURE: 130 MMHG | HEART RATE: 56 BPM | BODY MASS INDEX: 36.58 KG/M2 | HEIGHT: 69 IN

## 2021-10-06 DIAGNOSIS — R30.0 DYSURIA: Primary | ICD-10-CM

## 2021-10-06 LAB
POST-VOID RESIDUAL VOLUME, ML POC: 108 ML
SL AMB  POCT GLUCOSE, UA: NORMAL
SL AMB LEUKOCYTE ESTERASE,UA: NORMAL
SL AMB POCT BILIRUBIN,UA: NORMAL
SL AMB POCT BLOOD,UA: NORMAL
SL AMB POCT CLARITY,UA: CLEAR
SL AMB POCT COLOR,UA: YELLOW
SL AMB POCT KETONES,UA: NORMAL
SL AMB POCT NITRITE,UA: NORMAL
SL AMB POCT PH,UA: 7
SL AMB POCT SPECIFIC GRAVITY,UA: 1
SL AMB POCT URINE PROTEIN: NORMAL
SL AMB POCT UROBILINOGEN: 0.2

## 2021-10-06 PROCEDURE — 51798 US URINE CAPACITY MEASURE: CPT | Performed by: PHYSICIAN ASSISTANT

## 2021-10-06 PROCEDURE — 99213 OFFICE O/P EST LOW 20 MIN: CPT | Performed by: PHYSICIAN ASSISTANT

## 2021-10-06 PROCEDURE — 81002 URINALYSIS NONAUTO W/O SCOPE: CPT | Performed by: PHYSICIAN ASSISTANT

## 2022-04-19 NOTE — ANESTHESIA PREPROCEDURE EVALUATION
OFFICE VISIT      Patient: Gaston Weller Hayes at [facility]   Procedure:  CYSTOSCOPY WITH INSERTION UROLIFT (N/A Urethra)    Relevant Problems   CARDIO   (+) High cholesterol   (+) Hypertension      /RENAL   (+) Benign prostatic hyperplasia with urinary retention      PULMONARY   (+) Uncomplicated asthma (pt states recent bronchitis, waning cough)             Anesthesia Plan  ASA Score- 2     Anesthesia Type- IV sedation with anesthesia with ASA Monitors  Patient reason for not using neuraxial anesthesia or peripheral nerve block: dicussed lma/ett as back up  Additional Monitors:   Airway Plan:           Plan Factors-    Chart reviewed  Patient is not a current smoker  Induction- intravenous      Postoperative Plan-     Informed Consent- Marcy Chamorro, CNP

## 2022-06-10 ENCOUNTER — OFFICE VISIT (OUTPATIENT)
Dept: UROLOGY | Facility: CLINIC | Age: 77
End: 2022-06-10
Payer: MEDICARE

## 2022-06-10 VITALS
BODY MASS INDEX: 35.49 KG/M2 | HEIGHT: 69 IN | DIASTOLIC BLOOD PRESSURE: 64 MMHG | WEIGHT: 239.6 LBS | HEART RATE: 65 BPM | SYSTOLIC BLOOD PRESSURE: 128 MMHG

## 2022-06-10 DIAGNOSIS — N40.1 BPH WITH OBSTRUCTION/LOWER URINARY TRACT SYMPTOMS: Primary | ICD-10-CM

## 2022-06-10 DIAGNOSIS — N13.8 BPH WITH OBSTRUCTION/LOWER URINARY TRACT SYMPTOMS: Primary | ICD-10-CM

## 2022-06-10 PROCEDURE — 99213 OFFICE O/P EST LOW 20 MIN: CPT | Performed by: PHYSICIAN ASSISTANT

## 2022-06-14 NOTE — PROGRESS NOTES
UROLOGY PROGRESS NOTE   Patient Identifiers: Jose Daniel Maldonado (MRN [id number])  Date of Service: 6/14/2022    Subjective:    70-year-old man history of BPH and bulbar urethral stricture  He had UroLift implant in August   All irritative symptoms have resolved  Overall comfortable with his voiding pattern      Reason for visit:  BPH follow-up    Objective:     VITALS:    Vitals:    06/10/22 1503   BP: 128/64   Pulse: 65           LABS:  Lab Results   Component Value Date    HGB 13 5 08/30/2021    HCT 40 5 08/30/2021    WBC 5 57 08/30/2021     08/30/2021   ]    Lab Results   Component Value Date    K 3 8 08/30/2021     08/30/2021    CO2 28 08/30/2021    BUN 18 08/30/2021    CREATININE 1 10 08/30/2021    CALCIUM 9 6 08/30/2021   ]        INPATIENT MEDS:    Current Outpatient Medications:     albuterol (PROVENTIL HFA,VENTOLIN HFA) 90 mcg/act inhaler, Inhale 1 puff as needed , Disp: , Rfl:     Ascorbic Acid (VITAMIN C) 100 MG tablet, Take 100 mg by mouth daily, Disp: , Rfl:     aspirin 81 MG tablet, Take 81 mg by mouth daily, Disp: , Rfl:     atorvastatin (LIPITOR) 10 mg tablet, Take 10 mg by mouth, Disp: , Rfl:     B Complex-C-Folic Acid (NEPHROCAPS PO), Take 1 capsule by mouth, Disp: , Rfl:     esomeprazole (NexIUM) 40 mg packet, Take 40 mg by mouth, Disp: , Rfl:     finasteride (PROSCAR) 5 mg tablet, Take 5 mg by mouth daily, Disp: , Rfl:     fluticasone (Flovent HFA) 110 MCG/ACT inhaler, Inhale 1 puff as needed , Disp: , Rfl:     guaifenesin-codeine (GUAIFENESIN AC) 100-10 MG/5ML liquid, Take 5 mL by mouth as needed , Disp: , Rfl:     hydrochlorothiazide (HYDRODIURIL) 25 mg tablet, Take 25 mg by mouth, Disp: , Rfl:     metoprolol succinate (TOPROL-XL) 50 mg 24 hr tablet, Take 50 mg by mouth, Disp: , Rfl:     multivitamin (THERAGRAN) TABS, Take 1 tablet by mouth daily, Disp: , Rfl:     Omega-3 Fatty Acids (fish oil) 1,000 mg, Fish Oil, Disp: , Rfl:     acetaminophen (TYLENOL) 325 mg tablet, Take 2 tablets (650 mg total) by mouth every 4 (four) hours as needed for mild pain (Patient not taking: Reported on 6/10/2022), Disp: 30 tablet, Rfl: 0    budesonide (Pulmicort Flexhaler) 90 MCG/ACT inhaler, as needed  (Patient not taking: Reported on 6/10/2022), Disp: , Rfl:     docusate sodium (COLACE) 100 mg capsule, Take 1 capsule (100 mg total) by mouth 2 (two) times a day for 15 days (Patient not taking: Reported on 6/10/2022), Disp: 30 capsule, Rfl: 0    naproxen (NAPROSYN) 500 mg tablet, Take 1 tablet (500 mg total) by mouth 2 (two) times a day with meals for 5 days For pain (Patient not taking: Reported on 6/10/2022), Disp: 10 tablet, Rfl: 0    Silodosin 8 MG CAPS, Take 8 mg by mouth (Patient not taking: Reported on 6/10/2022), Disp: , Rfl:       Physical Exam:   /64 (BP Location: Right arm, Patient Position: Sitting, Cuff Size: Adult)   Pulse 65   Ht 5' 9" (1 753 m)   Wt 109 kg (239 lb 9 6 oz)   BMI 35 38 kg/m²   GEN: no acute distress    RESP: breathing comfortably with no accessory muscle use    ABD: soft, non-tender, non-distended   INCISION:    EXT: no significant peripheral edema     RADIOLOGY:   None     Assessment:   1   BPH     Plan:   -follow-up in 1 year with PSA prior to visit  -  -  -

## 2022-07-26 ENCOUNTER — TELEPHONE (OUTPATIENT)
Dept: DERMATOLOGY | Facility: CLINIC | Age: 77
End: 2022-07-26

## 2022-07-26 NOTE — TELEPHONE ENCOUNTER
Telephone call from patient  Spoke to patient in regards scheduling MOHS  Patient aware waiting  on notes, path report, and photo  Once receive we will call to set a appointment

## 2022-08-01 ENCOUNTER — CONSULT (OUTPATIENT)
Dept: DERMATOLOGY | Facility: CLINIC | Age: 77
End: 2022-08-01
Payer: MEDICARE

## 2022-08-01 VITALS — TEMPERATURE: 98.6 F | HEIGHT: 69 IN | BODY MASS INDEX: 35.77 KG/M2 | WEIGHT: 241.5 LBS

## 2022-08-01 DIAGNOSIS — C44.91 NODULAR BASAL CELL CARCINOMA: Primary | ICD-10-CM

## 2022-08-01 PROCEDURE — 99203 OFFICE O/P NEW LOW 30 MIN: CPT | Performed by: DERMATOLOGY

## 2022-08-01 NOTE — PROGRESS NOTES
[facility] MOHS Consult Note     Patient Name: Elly Soulier  Encounter Date: 08/01/2022     Have you been cared for by a [facility] Dermatologist in the last 3 years and, if so, which one? No    · Have you traveled outside of the [address] [place] [address] in the past 3 months or outside of the [facility] area in the last 2 weeks? No     May we call your Preferred Phone number to discuss your specific medical information? Yes     May we leave a detailed message that includes your specific medical information? Yes      Today's Chief Concerns:   Concern #1:  BCC scalp   Concern #2:      Past Medical History:  Have you personally ever had or currently have any of the following? · Skin cancer (such as Melanoma, Basal Cell Carcinoma, Squamous Cell Carcinoma? (If Yes, please provide more detail)- YES, BCC and SCC  · Eczema: No  · Psoriasis: No  · HIV/AIDS: No  · Hepatitis B or C: No  · Tuberculosis: No  · Systemic Immunosuppression such as Diabetes, Biologic or Immunotherapy, Chemotherapy, Organ Transplantation, Bone Marrow Transplantation (If YES, please provide more detail): No  · Radiation Treatment (If YES, please provide more detail): No  · Any other major medical conditions/concerns? (If Yes, which types)- No    Family History:  Have any of your "first degree relatives" (parent, brother, sister, or child) had any of the following       · Skin cancer such as Melanoma or Merkel Cell Carcinoma or Pancreatic Cancer? YES, father  · Eczema, Asthma, Hay Fever or Seasonal Allergies: No  · Psoriasis or Psoriatic Arthritis: No  · Do any other medical conditions seem to run in your family? If Yes, what condition and which relatives?   No    Current Medications:   (please update all dermatological medications before printing patient's AVS!)      Current Outpatient Medications:     albuterol (PROVENTIL HFA,VENTOLIN HFA) 90 mcg/act inhaler, Inhale 1 puff as needed , Disp: , Rfl:    Ascorbic Acid (VITAMIN C) 100 MG tablet, Take 100 mg by mouth daily, Disp: , Rfl:     aspirin 81 MG tablet, Take 81 mg by mouth daily, Disp: , Rfl:     atorvastatin (LIPITOR) 10 mg tablet, Take 10 mg by mouth, Disp: , Rfl:     B Complex-C-Folic Acid (NEPHROCAPS PO), Take 1 capsule by mouth, Disp: , Rfl:     esomeprazole (NexIUM) 40 mg packet, Take 40 mg by mouth, Disp: , Rfl:     finasteride (PROSCAR) 5 mg tablet, Take 5 mg by mouth daily, Disp: , Rfl:     fluticasone (Flovent HFA) 110 MCG/ACT inhaler, Inhale 1 puff as needed , Disp: , Rfl:     guaifenesin-codeine (GUAIFENESIN AC) 100-10 MG/5ML liquid, Take 5 mL by mouth as needed , Disp: , Rfl:     hydrochlorothiazide (HYDRODIURIL) 25 mg tablet, Take 25 mg by mouth, Disp: , Rfl:     metoprolol succinate (TOPROL-XL) 50 mg 24 hr tablet, Take 50 mg by mouth, Disp: , Rfl:     multivitamin (THERAGRAN) TABS, Take 1 tablet by mouth daily, Disp: , Rfl:     Omega-3 Fatty Acids (fish oil) 1,000 mg, Fish Oil, Disp: , Rfl:     acetaminophen (TYLENOL) 325 mg tablet, Take 2 tablets (650 mg total) by mouth every 4 (four) hours as needed for mild pain (Patient not taking: No sig reported), Disp: 30 tablet, Rfl: 0    budesonide (Pulmicort Flexhaler) 90 MCG/ACT inhaler, as needed  (Patient not taking: No sig reported), Disp: , Rfl:     docusate sodium (COLACE) 100 mg capsule, Take 1 capsule (100 mg total) by mouth 2 (two) times a day for 15 days (Patient not taking: Reported on 6/10/2022), Disp: 30 capsule, Rfl: 0    naproxen (NAPROSYN) 500 mg tablet, Take 1 tablet (500 mg total) by mouth 2 (two) times a day with meals for 5 days For pain (Patient not taking: Reported on 6/10/2022), Disp: 10 tablet, Rfl: 0    Silodosin 8 MG CAPS, Take 8 mg by mouth (Patient not taking: No sig reported), Disp: , Rfl:       Review of Systems:  Have you recently had or currently have any of the following? If YES, what are you doing for the problem?     · Fever, chills or unintended weight loss: No  · Sudden loss or change in your vision: No  · Nausea, vomiting or blood in your stool: No  · Painful or swollen joints: No  · Wheezing or cough: No  · Changing mole or non-healing wound: No  · Nosebleeds: No  · Excessive sweating: No  · Easy or prolonged bleeding? No  · Over the last 2 weeks, how often have you been bothered by the following problems? · Taking little interest or pleasure in doing things: 1 - Not at All  · Feeling down, depressed, or hopeless: 1 - Not at All  · Rapid heartbeat with epinephrine:  No        · Any known allergies? Allergies   Allergen Reactions    Dust Mite Extract Itching    Mixed Ragweed Itching    Molds & Smuts Itching    Chloromycetin [Chloramphenicol] Hives         Physical Exam:     Was a chaperone (Derm Clinical Assistant) present throughout the entire Physical Exam? Yes     Did the Dermatology Team specifically  the patient on the importance of a Full Skin Exam to be sure that nothing is missed clinically? Yes}  o Did the patient ultimately request or accept a Full Skin Exam?  NO      CONSTITUTIONAL:   Vitals:    08/01/22 1445   Temp: 98 6 °F (37 °C)   Weight: 110 kg (241 lb 8 oz)   Height: 5' 9" (1 753 m)           PSYCH: Normal mood and affect  EYES: Normal conjunctiva  ENT: Normal lips and oral mucosa  CARDIOVASCULAR: No edema  RESPIRATORY: Normal respirations  HEME/LYMPH/IMMUNO:  No regional lymphadenopathy except as noted below in "ASSESSMENT AND PLAN BY DIAGNOSIS"    SKIN:  FULL ORGAN SYSTEM EXAM   Hair, Scalp,  Normal except as noted below in Assessment   Neck, Cervical Chain Nodes    Right Arm/Hand/Fingers    Left Arm/Hand/Fingers    Chest/Breasts/Axillae    Abdomen, Umbilicus    Back/Spine    Groin/Genitalia/Buttocks    Right Leg, Foot, Toes    Left Leg, Foot, Toes         Assessment and Plan by Diagnosis:    1   MOHS CONSULTATION    Referring Physician:Lavern Reddy PA-C  Biopsy Location: Left Central Frontal Scalp  Resulting Laboratory:   Case number: [id number]   Biopsy diagnosis: basal cell carcinoma nodular  Patient on blood thinners: No          Reviewed directly with patient treatment options, specifically Mohs  Discussed possible surgical complications and alternatives  All questions were answered in full  Assessment and Plan:   Repair will require coordination with Plastic Surgery or Oculofacial Plastic Surgery: No   Will need Previous Dermatology office to confirm which site is the correct one     Pre- operative Mohs Telephone Scheduling Note    Do you have a pacemaker or defibrillator? no    Do you take antibiotics before skin or dental procedures? no  If yes, will likely require pre-operative antibiotics  Ask  the patient why they take the antibiotics (usually because of joint replacement)  Do you have a history of a joint replacements within the past 2 years? no   If yes, will likely require pre-operative antibiotics  Ask if orthopaedic surgeon has prescribed pre-operative antibiotics to take before procedures/dental work? Do you take any OTC medications that thin your blood (Aspirin, Aleve, Ibuprofen) or supplements that thin your blood (fish oil, garlic, vitamin E, Ginko Biloba)? yes: asa    Do you take any prescribed medications that thin your blood (Coumadin, Plavix, Xarelto, Eliquis or another prescribed blood thinner)? no    Do you have an allergy to lidocaine or epinephrine? no    Do you have an allergy to shellfish? no    Do you smoke? yes: daily cigar smoker      If yes,  patient to try and stop 2 days before surgery and 7 days after the surgery  Minimizing smoking as much as possible during this time will improve healing and the cosmetic result after surgery  Date scheduled:     Coordination of Care with other provider (Oculoplastics, Plastics, ENT) required? no   IF YES, PLEASE FORWARD TO APPROPRIATE PERSONNEL TO HELP COORDINATE      Are there remaining tumors to be scheduled? no     Was Prior Authorization obtained? Not needed since medicare is primary per insurance spoke with carina Lomeli  Wilma Scarlet on left central frontal scalp presenting for mohs consultation  Discussed that there are two sites but there is a prominent scalp lesion (site B)  We will confirm with referring practice for confirmation and plan for mohs at next available visit  Mohs procedure discussed in detail along with need for wound care afterwards      Scribe Attestation    I,:  Andrae Luciano am acting as a scribe while in the presence of the attending physician :       I,:  Magaly Santoro MD personally performed the services described in this documentation    as scribed in my presence :

## 2022-08-16 ENCOUNTER — PROCEDURE VISIT (OUTPATIENT)
Dept: DERMATOLOGY | Facility: CLINIC | Age: 77
End: 2022-08-16
Payer: MEDICARE

## 2022-08-16 VITALS
OXYGEN SATURATION: 97 % | TEMPERATURE: 97.6 F | HEIGHT: 69 IN | HEART RATE: 57 BPM | WEIGHT: 245.4 LBS | BODY MASS INDEX: 36.35 KG/M2 | DIASTOLIC BLOOD PRESSURE: 67 MMHG | SYSTOLIC BLOOD PRESSURE: 145 MMHG

## 2022-08-16 DIAGNOSIS — C44.91 NODULAR BASAL CELL CARCINOMA: Primary | ICD-10-CM

## 2022-08-16 PROCEDURE — 12032 INTMD RPR S/A/T/EXT 2.6-7.5: CPT | Performed by: STUDENT IN AN ORGANIZED HEALTH CARE EDUCATION/TRAINING PROGRAM

## 2022-08-16 PROCEDURE — 17311 MOHS 1 STAGE H/N/HF/G: CPT | Performed by: STUDENT IN AN ORGANIZED HEALTH CARE EDUCATION/TRAINING PROGRAM

## 2022-08-16 NOTE — PROGRESS NOTES
MOHS Procedure Note    Patient: Jonathon Camacho  : 1945  MRN: [id number]  Date: 2022    History of Present Illness: The patient is a 78 y o  male who presents with complaints of Basal cell Carcinoma of left central frontal scalp        Past Medical History:   Diagnosis Date    Asthma     Basal cell carcinoma 2022    Left central frontal scalp    Benign prostatic hyperplasia     Hypercholesteremia     Hypertension     Urinary retention        Past Surgical History:   Procedure Laterality Date    COLONOSCOPY      CYSTOSCOPY      HERNIA REPAIR      KNEE ARTHROPLASTY      KNEE SURGERY      left    CO CYSTOURETHRO W/IMPLANT N/A 2021    Procedure: CYSTOSCOPY WITH INSERTION UROLIFT;  Surgeon: Timmy Villarreal MD;  [facility];  Service: Urology    TOE SURGERY           Current Outpatient Medications:     acetaminophen (TYLENOL) 325 mg tablet, Take 2 tablets (650 mg total) by mouth every 4 (four) hours as needed for mild pain, Disp: 30 tablet, Rfl: 0    albuterol (PROVENTIL HFA,VENTOLIN HFA) 90 mcg/act inhaler, Inhale 1 puff as needed , Disp: , Rfl:     Ascorbic Acid (VITAMIN C) 100 MG tablet, Take 100 mg by mouth daily, Disp: , Rfl:     aspirin 81 MG tablet, Take 81 mg by mouth daily, Disp: , Rfl:     atorvastatin (LIPITOR) 10 mg tablet, Take 10 mg by mouth, Disp: , Rfl:     B Complex-C-Folic Acid (NEPHROCAPS PO), Take 1 capsule by mouth, Disp: , Rfl:     budesonide (Pulmicort Flexhaler) 90 MCG/ACT inhaler, as needed, Disp: , Rfl:     esomeprazole (NexIUM) 40 mg packet, Take 40 mg by mouth, Disp: , Rfl:     finasteride (PROSCAR) 5 mg tablet, Take 5 mg by mouth daily, Disp: , Rfl:     fluticasone (Flovent HFA) 110 MCG/ACT inhaler, Inhale 1 puff as needed , Disp: , Rfl:     guaifenesin-codeine (GUAIFENESIN AC) 100-10 MG/5ML liquid, Take 5 mL by mouth as needed , Disp: , Rfl:     hydrochlorothiazide (HYDRODIURIL) 25 mg tablet, Take 25 mg by mouth, Disp: , Rfl:     metoprolol succinate (TOPROL-XL) 50 mg 24 hr tablet, Take 50 mg by mouth, Disp: , Rfl:     multivitamin (THERAGRAN) TABS, Take 1 tablet by mouth daily, Disp: , Rfl:     Omega-3 Fatty Acids (fish oil) 1,000 mg, Fish Oil, Disp: , Rfl:     Silodosin 8 MG CAPS, Take 8 mg by mouth, Disp: , Rfl:     docusate sodium (COLACE) 100 mg capsule, Take 1 capsule (100 mg total) by mouth 2 (two) times a day for 15 days (Patient not taking: Reported on 6/10/2022), Disp: 30 capsule, Rfl: 0    naproxen (NAPROSYN) 500 mg tablet, Take 1 tablet (500 mg total) by mouth 2 (two) times a day with meals for 5 days For pain (Patient not taking: Reported on 6/10/2022), Disp: 10 tablet, Rfl: 0    Allergies   Allergen Reactions    Dust Mite Extract Itching    Mixed Ragweed Itching    Molds & Smuts Itching    Chloromycetin [Chloramphenicol] Hives       Physical Exam:   Vitals:    08/16/22 0847   BP: 145/67   Pulse: 57   Temp: 97 6 °F (36 4 °C)   SpO2: 97%     General: Awake, Alert, Oriented x 3, Mood and affect appropriate  Respiratory: Respirations even and unlabored  Cardiovascular: Peripheral pulses intact; no edema  Musculoskeletal Exam: n/a  Skin: 1 4 cm x 1 3 cm pink scar on the left central frontal scalp     Assessment: Biopsy confirmed Basal Cell Carcinoma Nodular type of left central frontal scalp     Plan: MOHS     MOHS Procedure Timeout    Flowsheet Row Most Recent Value   Timeout: 0900   Patient Identity Verified: Yes   Correct Site Verified: Yes   Correct Procedure Verified: Yes          MOHS Diagnosis/Indication/Location/ID    Flowsheet Row Most Recent Value   Pathology Type Basal cell carcinoma   Anatomic Site left frontal scalp   Indications for MOHS histologic pattern   MOHS ID [id number]          MOHS Site/Accession/Pre-Post    Flowsheet Row Most Recent Value   Original Site Identified (as submitted by referring clinician) Photo   Biopsy Accession/Specimen # (as submitted by referring clincian) [id number] Pre-MOHS Size Length (cm) 1 4   Pre-MOHS Size Width (cm) 1 3   Post-MOHS Size-Length (cm) 1 9   Post MOHS Size-Width (cm) 1 9   Repair Type Intermediate layered closure   Suture Type Vicryl, Monocryl, Prolene   Monocryl Plus Suture Size 4   Prolene Suture Size 5   Vicryl Suture Size 3   Final repair length (cm): 4   Anesthetic Used 1% Lidocaine with epinephrine          MOHS Tumor Stage 1 Information    Flowsheet Row Most Recent Value   Tissue Sections (blocks) 1   Microscopic Exam Section 1: No tumor identified in section  Tumor Clear After Stage I? Yes                           Patient identified, procedure verified, site identified and verified  Time out completed  Surgical removal of the lesion discussed with the patient (risks and benefits, including possibility of scarring, infection, recurrence or potential for further treatment)  I have specifically identified the site with the patient  I have discussed the fact that the patient will have a scar after the procedure regardless of granulation or repair with sutures  I have discussed that the repair options can range from granulation in some cases to linear or curvilinear closures to larger flaps or grafts  There are sometimes flaps or grafts used that require multiples stages of surgery and will not be completed today, rather be completed over a series of appointments  I have discussed that occasionally due to location, size or depth of the lesion I may recommend consultation with and transfer of care for further removal or the reconstruction to another provider such as ophthalmology surgery, plastic surgery, ENT surgery, or surgical oncology   There are cases in which other testing such as imaging with MRI or CT scan or testing of lymph nodes is recommended because of the nature/depth/location of tumor seen during the removal  There is a risk of injury to nerves causing temporary or permanent numbness or the inability to move muscles full such as the inability to lift eyebrows  Questions answered and verbal and written consent was obtained  The tumor qualifies for Mohs based on AUC criteria  With the patient in the supine position and under adequate local anesthesia with 1% lidocaine with epinephrine 1:100,000, the defect was scrubbed with Hibiclens  Sterile drapes were placed from the sterile tray  Because of the location of the surgical defect, an intermediate closure was judged to give the best possible cosmetic and functional result  The edges of the defect were carefully debrided removing any dead or coagulated tissue  Hemostasis was obtained by pinpoint electrocoagulation  Careful planning of removal of redundant tissue at either end of the defect was drawn out so that the suture lines would fall in the optimal orientation with regard to the relaxed skin tension lines  These were then removed with a #15 blade scalpel  The wound was then approximated by a deep layer of buried vertical mattress sutures and the cutaneous margins were approximated and closed by superficial sutures as noted above  Estimated blood loss was less than 5 mL  The patient tolerated the procedure well  The wound was dressed with petrolatum, a non-stick pad, and a compression dressing  Jaja Hernandez MD served as the surgeon and pathologist during the procedure  Postoperative care: Wound care discussed at length  I urged the patient to call us if any problems or question should arise  Complications: none  Post-op medications: none  Patient condition after procedure: stable  Discharge plans: Plan for suture removal 10-14 days, at next scheduled appointment       Scribe Attestation    I,:  Familia Win am acting as a scribe while in the presence of the attending physician :       I,:  Jaja Hernandez MD personally performed the services described in this documentation    as scribed in my presence :

## 2022-08-16 NOTE — PATIENT INSTRUCTIONS
[facility] After Care    WOUND CARE AFTER SURGERY:    Do NOT to remove the pressure bandage for 48 hours  Keep the area clean and dry while this bandage is on  After removing the bandage for the first time, gently clean the area with soap and water  If the bandage is difficult to remove, getting the bandage wet in the shower will sometimes help soften the adhesive and allow it to be removed more easily  You will now need to cleanse this area daily in the shower with gentle soap  There is no need to scrub the area  Apply plain Vaseline ointment (this is over the counter and not a prescription) to the site followed by a clean appropriately sized bandage to area  Non stick dressing and paper tape (or Hypafix) are recommended for sensitive skin but a bandaid is fine if it covers the area well  You will need to continue the above daily wound care until you return for suture removal in 10 -14  days (generally 7 days for the face, 10-14 days off the face)      RESTRICTIONS:     For two DAYS:   - You will need to take it very easy as this time is highest risk for bleeding  Being a "couch potato" during these two days is generally recommended  - For surgeries on the face/neck/scalp: Avoid leaning down to pick things up off the floor as this brings blood up to your head  Instead, squat down to pick things up  For two WEEKS:   - No heavy lifting (anything greater than 10 pounds)   - You can start to do slow, gentle activities such as slow walking but nothing to increase your heart rate and blood pressure too much (such as cardiovascular exercise)  It is important to take it easy as there is still a risk for bleeding and a high risk popping of stitches open during this time  If we did surgery near the eyes (including the nose, forehead, front part of your scalp, cheeks): It is VERY common to get a large amount of swelling around your eyes (puffy eyes)   Although less frequent, this can be enough to swell your eyes shut and can also come along with bruising  This should not hurt and is very expected and normal  It is typically worst at ~ 3 days out from your surgery and dramatically better 1 week post-operatively  MANAGING YOUR PAIN AFTER SURGERY     You can expect to have some pain after surgery  This is normal  The pain is typically worse the first two days after surgery, and quickly begins to get better  The best strategy for controlling your pain after surgery is around the clock pain control  You can take over the counter Acetaminophen (Tylenol) for discomfort, if no contraindications  If you are taking this at the maximum dose, you can alternate this with Motrin (ibuprofen or Advil) as well  Alternating these medications with each other allows you to maximize your pain control  In addition to Tylenol and Motrin, you can use heating pads or ice packs on your incisions to help reduce your pain  How will I alternate your regular strength over-the-counter pain medication? You will take a dose of pain medication every three hours  Start by taking 650 mg of Tylenol (2 pills of 325 mg)   3 hours later take 600 mg of Motrin (3 pills of 200 mg)   3 hours after taking the Motrin take 650 mg of Tylenol   3 hours after that take 600 mg of Motrin  See example - if your first dose of Tylenol is at 12:00 PM     12:00 PM  Tylenol 650 mg (2 pills of 325 mg)    3:00 PM  Motrin 600 mg (3 pills of 200 mg)    6:00 PM  Tylenol 650 mg (2 pills of 325 mg)    9:00 PM  Motrin 600 mg (3 pills of 200 mg)    Continue alternating every 3 hours      Important:   Do not take more than 4000mg of Tylenol or 3200mg of Motrin in a 24-hour period  What if I still have pain? If you have pain that is not controlled with the over-the-counter pain medications (Tylenol and Motrin or Advil), don't hesitate to call our staff using the number provided   We will help make sure you are managing your pain in the best way possible, and if necessary, we can provide a prescription for additional pain medication  CALL OUR OFFICE IMMEDIATELY FOR ANY SIGNS OF INFECTION:    This includes fever, chills, increased redness, warmth, tenderness, severe discomfort/pain, or pus or foul smell coming from the wound  [facility] directly at [phone] (SKIN)    IF BLEEDING IS NOTICED:    Place a clean cloth over the area and apply firm pressure for thirty minutes  Check the wound ONLY after 30 minutes of direct pressure; do not cheat and sneak a peak, as that does not count  If bleeding persists after 30 minutes of legitimate direct pressure, then try one more round of direct pressure to the area  Should the bleeding become heavier or not stop after the second attempt, call [facility] directly at [phone] (SKIN)  Your call will get routed to the dermatology surgeon on call even after hours

## 2022-08-30 ENCOUNTER — OFFICE VISIT (OUTPATIENT)
Dept: DERMATOLOGY | Facility: CLINIC | Age: 77
End: 2022-08-30

## 2022-08-30 DIAGNOSIS — Z48.02 VISIT FOR SUTURE REMOVAL: Primary | ICD-10-CM

## 2022-08-30 PROCEDURE — RECHECK: Performed by: STUDENT IN AN ORGANIZED HEALTH CARE EDUCATION/TRAINING PROGRAM

## 2022-08-30 NOTE — PROGRESS NOTES
Suture removal    Date/Time: 8/30/2022 11:27 AM  Performed by: Harrison Cat MA  Authorized by: Juan Almanzar MD   Universal Protocol:  Consent: Verbal consent obtained  Risks and benefits: risks, benefits and alternatives were discussed  Consent given by: patient  Timeout called at: 8/30/2022 11:27 AM   Patient understanding: patient states understanding of the procedure being performed  Patient consent: the patient's understanding of the procedure matches consent given  Procedure consent: procedure consent matches procedure scheduled  Relevant documents: relevant documents present and verified  Patient identity confirmed: verbally with patient        Patient location:  Clinic  Location:     Laterality:  Left    Location:  [address] [facility] location:  Scalp (Left central frontal scalp )  Procedure details: Tools used:  Suture removal kit    Wound appearance:  No sign(s) of infection, good wound healing, clean, moist and pink    Number of sutures removed:  4 (running suture )  Post-procedure details:     Post-removal:  Antibiotic ointment applied    Patient tolerance of procedure: Tolerated well, no immediate complications  Comments:      Patient denies any pain or discomfort at this time  Patient advised to continue with full body skin checks every 6 months             Scribe Attestation    I,:   am acting as a scribe while in the presence of the attending physician :       I,:   personally performed the services described in this documentation    as scribed in my presence :

## 2022-08-30 NOTE — PATIENT INSTRUCTIONS
Patient denies any pain or discomfort at this time  Patient advised to continue with full body skin checks every 6 months

## 2023-06-07 ENCOUNTER — PROCEDURE VISIT (OUTPATIENT)
Age: 78
End: 2023-06-07
Payer: MEDICARE

## 2023-06-07 VITALS
HEART RATE: 74 BPM | HEIGHT: 69 IN | DIASTOLIC BLOOD PRESSURE: 87 MMHG | SYSTOLIC BLOOD PRESSURE: 140 MMHG | WEIGHT: 245 LBS | BODY MASS INDEX: 36.29 KG/M2

## 2023-06-07 DIAGNOSIS — M99.03 SEGMENTAL DYSFUNCTION OF LUMBAR REGION: ICD-10-CM

## 2023-06-07 DIAGNOSIS — M54.50 ACUTE LEFT-SIDED LOW BACK PAIN WITHOUT SCIATICA: ICD-10-CM

## 2023-06-07 DIAGNOSIS — M99.05 SEGMENTAL DYSFUNCTION OF PELVIC REGION: Primary | ICD-10-CM

## 2023-06-07 DIAGNOSIS — M54.16 LUMBAR RADICULOPATHY: ICD-10-CM

## 2023-06-07 DIAGNOSIS — M99.04 SEGMENTAL DYSFUNCTION OF SACRAL REGION: ICD-10-CM

## 2023-06-07 PROCEDURE — 98941 CHIROPRACT MANJ 3-4 REGIONS: CPT | Performed by: CHIROPRACTOR

## 2023-06-11 NOTE — PROGRESS NOTES
Date of first visit: 6/7/2023      HPI:  Since for treatment today of left-sided low back and hip pain  He reports that while in [place] he had insidious onset of left-sided hip tightness and stiffness especially when trying to twist and turn the symptoms have persisted since that time  Denies any significant radicular complaints  Denies any significant numbness or tingling or weakness  He has been trying to stretch on a regular basis but for the most part notes outside to golf he has been relatively sedentary  Denies any changes in bowel bladder habits  Reports no other related factors  The following portions of the patient's history were reviewed and updated as appropriate: allergies, current medications, past family history, past medical history, past social history, past surgical history, and problem list     Review of Systems    Physical Exam:  Exam today reveals Elke to be able to rise from a seated position with slight difficulty he has a visible pelvic liquidy there is an elevated left versus right innominate he has internal rotation noted of the left innominate on sacrum there is sacral base rotation noted on the left biomechanically he has pain on extension left lateral bending left rotation the area of the left sacroiliac joint  He is tender over the joint in the left parasacral region  Active myofascial triggers in the left gluteus medius and left piriformis  Examination neurologically fails to reveal any evidence of focal deficit in the lower extremity he does not appreciate any nerve root tension signs Alcides and toe walk without incident  Internal/external rotation of the left hip produces no sharp groin pain the symptoms are mainly to the posterior and posterior lateral region  He has some tenderness over the IT band and left trochanteric bursa  Assessment:   Diagnosis ICD-10-CM Associated Orders   1  Segmental dysfunction of pelvic region  M99 05       2   Lumbar radiculopathy  M54 16       3  Acute left-sided low back pain without sciatica  M54 50       4  Segmental dysfunction of sacral region  M99 04       5  Segmental dysfunction of lumbar region  M99 03                 Treatment: 84114  Manipulation to the left innominate, sacrum, L5 via Hare drop maneuver well-tolerated    Discussion:  I reviewed home care instructions including usage of ice stretching we will see him back for follow-up

## 2023-06-12 ENCOUNTER — PROCEDURE VISIT (OUTPATIENT)
Age: 78
End: 2023-06-12
Payer: MEDICARE

## 2023-06-12 VITALS
HEIGHT: 69 IN | BODY MASS INDEX: 36.29 KG/M2 | HEART RATE: 60 BPM | DIASTOLIC BLOOD PRESSURE: 89 MMHG | WEIGHT: 245 LBS | SYSTOLIC BLOOD PRESSURE: 149 MMHG

## 2023-06-12 DIAGNOSIS — M99.05 SEGMENTAL DYSFUNCTION OF PELVIC REGION: Primary | ICD-10-CM

## 2023-06-12 DIAGNOSIS — M54.16 LUMBAR RADICULOPATHY: ICD-10-CM

## 2023-06-12 DIAGNOSIS — M54.50 ACUTE LEFT-SIDED LOW BACK PAIN WITHOUT SCIATICA: ICD-10-CM

## 2023-06-12 DIAGNOSIS — M99.04 SEGMENTAL DYSFUNCTION OF SACRAL REGION: ICD-10-CM

## 2023-06-12 DIAGNOSIS — M99.03 SEGMENTAL DYSFUNCTION OF LUMBAR REGION: ICD-10-CM

## 2023-06-12 PROCEDURE — 98941 CHIROPRACT MANJ 3-4 REGIONS: CPT | Performed by: CHIROPRACTOR

## 2023-06-12 NOTE — PROGRESS NOTES
Date of first visit: 6/7/2023      HPI:  Valente Florian returns for treatment, ongoing symptoms  Left low back and hip  VPAS  5/6     The following portions of the patient's history were reviewed and updated as appropriate: allergies, current medications, past family history, past medical history, past social history, past surgical history, and problem list     Review of Systems    Physical Exam:  Pelvic obliquity leg length inequality  Joint dysfunction left SI, L5/S1    Assessment:   Diagnosis ICD-10-CM Associated Orders   1  Segmental dysfunction of pelvic region  M99 05       2  Lumbar radiculopathy  M54 16       3  Acute left-sided low back pain without sciatica  M54 50       4  Segmental dysfunction of sacral region  M99 04       5  Segmental dysfunction of lumbar region  M99 03                 Treatment: 25424  Manipulation left innominate, sacrum, and L5  Discussion:  Continue icing

## 2023-06-13 ENCOUNTER — TELEPHONE (OUTPATIENT)
Dept: DERMATOLOGY | Facility: CLINIC | Age: 78
End: 2023-06-13

## 2023-06-13 NOTE — TELEPHONE ENCOUNTER
Est pt calling to confirm office notes/pathology received from [facility] - confirmed w/pt rec'd 6/6  Please review and contact pt to schedule MOHS procedure

## 2023-06-14 ENCOUNTER — TELEPHONE (OUTPATIENT)
Dept: UROLOGY | Facility: AMBULATORY SURGERY CENTER | Age: 78
End: 2023-06-14

## 2023-06-14 DIAGNOSIS — N13.8 BPH WITH OBSTRUCTION/LOWER URINARY TRACT SYMPTOMS: Primary | ICD-10-CM

## 2023-06-14 DIAGNOSIS — N40.1 BPH WITH OBSTRUCTION/LOWER URINARY TRACT SYMPTOMS: Primary | ICD-10-CM

## 2023-06-14 NOTE — TELEPHONE ENCOUNTER
Patient called and requested a yearly follow-up appt at [facility], AP fine  First available I could find was 8/8/23 with Andrae Nelson, pt agreeable with plan  Pt requested PSA order be mailed, as pt uses [facility] lab work  Script printed and put in today's mail

## 2023-06-22 NOTE — TELEPHONE ENCOUNTER
Waiting on clarification to which spots we will MOHS from [facility]- will call the patient by Monday to discuss

## 2023-07-03 ENCOUNTER — PROCEDURE VISIT (OUTPATIENT)
Age: 78
End: 2023-07-03
Payer: MEDICARE

## 2023-07-03 VITALS — BODY MASS INDEX: 36.18 KG/M2 | HEIGHT: 69 IN | SYSTOLIC BLOOD PRESSURE: 132 MMHG | DIASTOLIC BLOOD PRESSURE: 76 MMHG

## 2023-07-03 DIAGNOSIS — M99.03 SEGMENTAL DYSFUNCTION OF LUMBAR REGION: ICD-10-CM

## 2023-07-03 DIAGNOSIS — M54.16 LUMBAR RADICULOPATHY: ICD-10-CM

## 2023-07-03 DIAGNOSIS — M99.04 SEGMENTAL DYSFUNCTION OF SACRAL REGION: ICD-10-CM

## 2023-07-03 DIAGNOSIS — M99.05 SEGMENTAL DYSFUNCTION OF PELVIC REGION: Primary | ICD-10-CM

## 2023-07-03 DIAGNOSIS — M54.50 ACUTE LEFT-SIDED LOW BACK PAIN WITHOUT SCIATICA: ICD-10-CM

## 2023-07-03 PROCEDURE — 98941 CHIROPRACT MANJ 3-4 REGIONS: CPT | Performed by: CHIROPRACTOR

## 2023-07-03 NOTE — PROGRESS NOTES
Date of first visit: 6/7/2023      HPI:  Willy Rosales returns for treatment, ongoing symptoms. Left low back and hip. Is improving. VPAS  5 only when moving certain ways. The following portions of the patient's history were reviewed and updated as appropriate: allergies, current medications, past family history, past medical history, past social history, past surgical history, and problem list.    Review of Systems    Physical Exam:  Pelvic obliquity leg length inequality. Joint dysfunction left SI, L5/S1    Assessment:   Diagnosis ICD-10-CM Associated Orders   1. Segmental dysfunction of pelvic region  M99.05       2. Lumbar radiculopathy  M54.16       3. Acute left-sided low back pain without sciatica  M54.50       4. Segmental dysfunction of sacral region  M99.04       5. Segmental dysfunction of lumbar region  M99.03                 Treatment: 20037  Manipulation left innominate, sacrum, and L5. Discussion:  Continue icing. Daily stretching. See back this week.

## 2023-07-18 ENCOUNTER — PROCEDURE VISIT (OUTPATIENT)
Age: 78
End: 2023-07-18
Payer: MEDICARE

## 2023-07-18 VITALS — BODY MASS INDEX: 36.18 KG/M2 | DIASTOLIC BLOOD PRESSURE: 80 MMHG | HEIGHT: 69 IN | SYSTOLIC BLOOD PRESSURE: 132 MMHG

## 2023-07-18 DIAGNOSIS — M99.05 SEGMENTAL DYSFUNCTION OF PELVIC REGION: Primary | ICD-10-CM

## 2023-07-18 DIAGNOSIS — M99.03 SEGMENTAL DYSFUNCTION OF LUMBAR REGION: ICD-10-CM

## 2023-07-18 DIAGNOSIS — M54.16 LUMBAR RADICULOPATHY: ICD-10-CM

## 2023-07-18 DIAGNOSIS — M54.50 ACUTE LEFT-SIDED LOW BACK PAIN WITHOUT SCIATICA: ICD-10-CM

## 2023-07-18 DIAGNOSIS — M99.04 SEGMENTAL DYSFUNCTION OF SACRAL REGION: ICD-10-CM

## 2023-07-18 PROCEDURE — 98941 CHIROPRACT MANJ 3-4 REGIONS: CPT | Performed by: CHIROPRACTOR

## 2023-07-18 NOTE — PROGRESS NOTES
Date of first visit: 6/7/2023      HPI:  Faustina Boxer returns for treatment, ongoing symptoms. Left low back and hip. Is improving. VPAS  5 only when moving certain ways. The following portions of the patient's history were reviewed and updated as appropriate: allergies, current medications, past family history, past medical history, past social history, past surgical history, and problem list.    Review of Systems    Physical Exam:  Pelvic obliquity leg length inequality. Joint dysfunction left SI, L5/S1    Assessment:   Diagnosis ICD-10-CM Associated Orders   1. Segmental dysfunction of pelvic region  M99.05       2. Lumbar radiculopathy  M54.16       3. Acute left-sided low back pain without sciatica  M54.50       4. Segmental dysfunction of sacral region  M99.04       5. Segmental dysfunction of lumbar region  M99.03                 Treatment: 40207  Manipulation left innominate, sacrum, and L5. Discussion:  Continue icing. Daily stretching. See back this week.

## 2023-08-02 ENCOUNTER — PROCEDURE VISIT (OUTPATIENT)
Age: 78
End: 2023-08-02
Payer: MEDICARE

## 2023-08-02 VITALS
DIASTOLIC BLOOD PRESSURE: 83 MMHG | SYSTOLIC BLOOD PRESSURE: 135 MMHG | WEIGHT: 245 LBS | HEIGHT: 69 IN | BODY MASS INDEX: 36.29 KG/M2

## 2023-08-02 DIAGNOSIS — M54.50 ACUTE LEFT-SIDED LOW BACK PAIN WITHOUT SCIATICA: ICD-10-CM

## 2023-08-02 DIAGNOSIS — M99.03 SEGMENTAL DYSFUNCTION OF LUMBAR REGION: ICD-10-CM

## 2023-08-02 DIAGNOSIS — M99.05 SEGMENTAL DYSFUNCTION OF PELVIC REGION: Primary | ICD-10-CM

## 2023-08-02 DIAGNOSIS — M99.04 SEGMENTAL DYSFUNCTION OF SACRAL REGION: ICD-10-CM

## 2023-08-02 DIAGNOSIS — M54.16 LUMBAR RADICULOPATHY: ICD-10-CM

## 2023-08-02 PROCEDURE — 98941 CHIROPRACT MANJ 3-4 REGIONS: CPT | Performed by: CHIROPRACTOR

## 2023-08-02 NOTE — PROGRESS NOTES
Date of first visit: 6/7/2023      HPI:  Nancy Lopez returns for treatment, ongoing symptoms. Left low back and hip. Is improving. VPAS  4 only when moving certain ways. The following portions of the patient's history were reviewed and updated as appropriate: allergies, current medications, past family history, past medical history, past social history, past surgical history, and problem list.    Review of Systems    Physical Exam:  Pelvic obliquity leg length inequality. Joint dysfunction left SI, L5/S1    Assessment:   Diagnosis ICD-10-CM Associated Orders   1. Segmental dysfunction of pelvic region  M99.05       2. Lumbar radiculopathy  M54.16       3. Acute left-sided low back pain without sciatica  M54.50       4. Segmental dysfunction of sacral region  M99.04       5. Segmental dysfunction of lumbar region  M99.03                 Treatment: 38784  Manipulation left innominate, sacrum, and L5. Discussion:  Continue icing. Daily stretching. See back in 2 weeks.

## 2023-08-07 ENCOUNTER — OFFICE VISIT (OUTPATIENT)
Dept: UROLOGY | Facility: CLINIC | Age: 78
End: 2023-08-07
Payer: MEDICARE

## 2023-08-07 VITALS
DIASTOLIC BLOOD PRESSURE: 82 MMHG | WEIGHT: 246 LBS | HEIGHT: 69 IN | HEART RATE: 62 BPM | BODY MASS INDEX: 36.43 KG/M2 | OXYGEN SATURATION: 97 % | SYSTOLIC BLOOD PRESSURE: 138 MMHG

## 2023-08-07 DIAGNOSIS — N52.9 ERECTILE DYSFUNCTION, UNSPECIFIED ERECTILE DYSFUNCTION TYPE: ICD-10-CM

## 2023-08-07 DIAGNOSIS — N13.8 BPH WITH OBSTRUCTION/LOWER URINARY TRACT SYMPTOMS: Primary | ICD-10-CM

## 2023-08-07 DIAGNOSIS — N40.1 BPH WITH OBSTRUCTION/LOWER URINARY TRACT SYMPTOMS: Primary | ICD-10-CM

## 2023-08-07 LAB — POST-VOID RESIDUAL VOLUME, ML POC: 40 ML

## 2023-08-07 PROCEDURE — 99213 OFFICE O/P EST LOW 20 MIN: CPT | Performed by: PHYSICIAN ASSISTANT

## 2023-08-07 PROCEDURE — 51798 US URINE CAPACITY MEASURE: CPT | Performed by: PHYSICIAN ASSISTANT

## 2023-08-07 RX ORDER — TADALAFIL 20 MG/1
20 TABLET ORAL DAILY PRN
Qty: 10 TABLET | Refills: 3 | Status: SHIPPED | OUTPATIENT
Start: 2023-08-07

## 2023-08-07 NOTE — PROGRESS NOTES
1. BPH with obstruction/lower urinary tract symptoms  POCT Measure PVR      2. Erectile dysfunction, unspecified erectile dysfunction type  tadalafil (CIALIS) 20 MG tablet          Assessment and plan:       1. BPH s/p Urolift  - continue finasteride daily    2. Prostate cancer screening  - normal MARIELLE  - PSA stable  - d/c routine surveillance    3. ED  - trial of cialis     Kailyn Blanco PA-C      Chief Complaint     Chief Complaint   Patient presents with   • Follow-up     PT unable to urinate. PT went to Urinate PTV 1-1 and a half hours PTV         History of Present Illness     Eleazar Franco is a 78 y.o. male presenting today for follow up.    history of BPH and bulbar urethral stricture. He had UroLift implant in August.  Remains on finasteride monotherapy. Comfortable with LUTS - denies any dysuria, gross hematuria, incontinence, nor infections. Has noted worsening erections.      PSA 0.5 (7/26/23)      PVR 40mL  AUA SYMPTOM SCORE    Flowsheet Row Most Recent Value   AUA SYMPTOM SCORE    How often have you had a sensation of not emptying your bladder completely after you finished urinating? 2 (P)     How often have you had to urinate again less than two hours after you finished urinating? 2 (P)     How often have you found you stopped and started again several times when you urinate? 2 (P)     How often have you found it difficult to postpone urination? 2 (P)     How often have you had a weak urinary stream? 3 (P)     How often have you had to push or strain to begin urination? 1 (P)     How many times did you most typically get up to urinate from the time you went to bed at night until the time you got up in the morning? 5 (P)     Quality of Life: If you were to spend the rest of your life with your urinary condition just the way it is now, how would you feel about that? 2 (P)     AUA SYMPTOM SCORE 17 (P)             Laboratory     Lab Results   Component Value Date    CREATININE 1.10 08/30/2021       Review of Systems     Review of Systems   Constitutional: Negative for activity change, appetite change, chills, diaphoresis, fatigue, fever and unexpected weight change. Respiratory: Negative for chest tightness and shortness of breath. Cardiovascular: Negative for chest pain, palpitations and leg swelling. Gastrointestinal: Negative for abdominal distention, abdominal pain, constipation, diarrhea, nausea and vomiting. Genitourinary: Negative for decreased urine volume, difficulty urinating, dysuria, enuresis, flank pain, frequency, genital sores, hematuria and urgency. Musculoskeletal: Negative for back pain, gait problem and myalgias. Skin: Negative for color change, pallor, rash and wound. Psychiatric/Behavioral: Negative for behavioral problems. The patient is not nervous/anxious. Allergies     Allergies   Allergen Reactions   • Dust Mite Extract Itching   • Mixed Ragweed Itching   • Molds & Smuts Itching   • Chloromycetin [Chloramphenicol] Hives       Physical Exam     Physical Exam  Constitutional:       General: He is not in acute distress. Appearance: Normal appearance. He is obese. He is not ill-appearing, toxic-appearing or diaphoretic. HENT:      Head: Normocephalic and atraumatic. Eyes:      General:         Right eye: No discharge. Left eye: No discharge. Conjunctiva/sclera: Conjunctivae normal.   Pulmonary:      Effort: Pulmonary effort is normal. No respiratory distress. Genitourinary:     Comments: Good rectal tone. Prostate 50g, smooth, symmetric, no nodules  Musculoskeletal:         General: No swelling or tenderness. Normal range of motion. Skin:     General: Skin is warm and dry. Coloration: Skin is not jaundiced or pale. Neurological:      General: No focal deficit present. Mental Status: He is alert and oriented to person, place, and time.    Psychiatric:         Mood and Affect: Mood normal.         Behavior: Behavior normal.         Thought Content:  Thought content normal.         Vital Signs     Vitals:    08/07/23 1315   BP: 138/82   Pulse: 62   SpO2: 97%   Weight: 112 kg (246 lb)   Height: 5' 9" (1.753 m)         Current Medications       Current Outpatient Medications:   •  acetaminophen (TYLENOL) 325 mg tablet, Take 2 tablets (650 mg total) by mouth every 4 (four) hours as needed for mild pain, Disp: 30 tablet, Rfl: 0  •  albuterol (PROVENTIL HFA,VENTOLIN HFA) 90 mcg/act inhaler, Inhale 1 puff as needed , Disp: , Rfl:   •  Ascorbic Acid (VITAMIN C) 100 MG tablet, Take 100 mg by mouth daily, Disp: , Rfl:   •  aspirin 81 MG tablet, Take 81 mg by mouth daily, Disp: , Rfl:   •  atorvastatin (LIPITOR) 10 mg tablet, Take 10 mg by mouth, Disp: , Rfl:   •  B Complex-C-Folic Acid (NEPHROCAPS PO), Take 1 capsule by mouth, Disp: , Rfl:   •  budesonide (Pulmicort Flexhaler) 90 MCG/ACT inhaler, as needed, Disp: , Rfl:   •  esomeprazole (NexIUM) 40 mg packet, Take 40 mg by mouth, Disp: , Rfl:   •  finasteride (PROSCAR) 5 mg tablet, Take 5 mg by mouth daily, Disp: , Rfl:   •  fluticasone (Flovent HFA) 110 MCG/ACT inhaler, Inhale 1 puff as needed , Disp: , Rfl:   •  guaifenesin-codeine (GUAIFENESIN AC) 100-10 MG/5ML liquid, Take 5 mL by mouth as needed , Disp: , Rfl:   •  hydrochlorothiazide (HYDRODIURIL) 25 mg tablet, Take 25 mg by mouth, Disp: , Rfl:   •  metoprolol succinate (TOPROL-XL) 50 mg 24 hr tablet, Take 50 mg by mouth, Disp: , Rfl:   •  multivitamin (THERAGRAN) TABS, Take 1 tablet by mouth daily, Disp: , Rfl:   •  Omega-3 Fatty Acids (fish oil) 1,000 mg, Fish Oil, Disp: , Rfl:   •  tadalafil (CIALIS) 20 MG tablet, Take 1 tablet (20 mg total) by mouth daily as needed for erectile dysfunction, Disp: 10 tablet, Rfl: 3  •  docusate sodium (COLACE) 100 mg capsule, Take 1 capsule (100 mg total) by mouth 2 (two) times a day for 15 days (Patient not taking: Reported on 6/10/2022), Disp: 30 capsule, Rfl: 0  •  naproxen (NAPROSYN) 500 mg tablet, Take 1 tablet (500 mg total) by mouth 2 (two) times a day with meals for 5 days For pain (Patient not taking: Reported on 6/10/2022), Disp: 10 tablet, Rfl: 0      Active Problems     Patient Active Problem List   Diagnosis   • Hypertension   • Hale's esophagus   • High cholesterol   • Uncomplicated asthma   • Benign prostatic hyperplasia with urinary retention         Past Medical History     Past Medical History:   Diagnosis Date   • Asthma    • Basal cell carcinoma 04/06/2022    Left central frontal scalp   • Benign prostatic hyperplasia    • Hypercholesteremia    • Hypertension    • Urinary retention          Surgical History     Past Surgical History:   Procedure Laterality Date   • COLONOSCOPY     • CYSTOSCOPY     • HERNIA REPAIR     • KNEE ARTHROPLASTY     • KNEE SURGERY      left   • ME CYSTO INSERTION TRANSPROSTATIC IMPLANT SINGLE N/A 8/27/2021    Procedure: CYSTOSCOPY WITH INSERTION Rehana Anderson;  Surgeon: Rashid Peters MD;  [facility]: [facility] MAIN OR;  Service: Urology   • TOE SURGERY           Family History     Family History   Problem Relation Age of Onset   • Heart disease Father    • COPD Father    • Stroke Mother    • Lung cancer Mother    • No Known Problems Sister    • Deafness Sister    • No Known Problems Son    • No Known Problems Daughter    • No Known Problems Daughter    • No Known Problems Daughter          Social History     Social History       Radiology

## 2023-08-16 ENCOUNTER — PROCEDURE VISIT (OUTPATIENT)
Age: 78
End: 2023-08-16
Payer: MEDICARE

## 2023-08-16 VITALS
HEIGHT: 69 IN | SYSTOLIC BLOOD PRESSURE: 119 MMHG | WEIGHT: 246 LBS | DIASTOLIC BLOOD PRESSURE: 76 MMHG | BODY MASS INDEX: 36.43 KG/M2

## 2023-08-16 DIAGNOSIS — M99.03 SEGMENTAL DYSFUNCTION OF LUMBAR REGION: ICD-10-CM

## 2023-08-16 DIAGNOSIS — M54.50 ACUTE LEFT-SIDED LOW BACK PAIN WITHOUT SCIATICA: ICD-10-CM

## 2023-08-16 DIAGNOSIS — M54.16 LUMBAR RADICULOPATHY: ICD-10-CM

## 2023-08-16 DIAGNOSIS — M99.05 SEGMENTAL DYSFUNCTION OF PELVIC REGION: Primary | ICD-10-CM

## 2023-08-16 DIAGNOSIS — M99.04 SEGMENTAL DYSFUNCTION OF SACRAL REGION: ICD-10-CM

## 2023-08-16 PROCEDURE — 98941 CHIROPRACT MANJ 3-4 REGIONS: CPT | Performed by: CHIROPRACTOR

## 2023-08-16 NOTE — PROGRESS NOTES
Date of first visit: 6/7/2023      HPI:  Faustina Boxer returns for treatment, ongoing symptoms. Left low back and hip. Is improving. VPAS  4 only when moving certain ways. The following portions of the patient's history were reviewed and updated as appropriate: allergies, current medications, past family history, past medical history, past social history, past surgical history, and problem list.    Review of Systems    Physical Exam:  Pelvic obliquity leg length inequality. Joint dysfunction left SI, L5/S1    Assessment:   Diagnosis ICD-10-CM Associated Orders   1. Segmental dysfunction of pelvic region  M99.05       2. Lumbar radiculopathy  M54.16       3. Acute left-sided low back pain without sciatica  M54.50       4. Segmental dysfunction of sacral region  M99.04       5. Segmental dysfunction of lumbar region  M99.03                 Treatment: 13293  Manipulation left innominate, sacrum, and L5. Discussion:  Continue icing. Daily stretching. See back in 3-4 weeks. Discussed sleeping positions. He is going to try and stop sleeping on stomach.

## 2023-08-22 ENCOUNTER — PROCEDURE VISIT (OUTPATIENT)
Dept: DERMATOLOGY | Facility: CLINIC | Age: 78
End: 2023-08-22
Payer: MEDICARE

## 2023-08-22 VITALS
HEIGHT: 69 IN | HEART RATE: 60 BPM | WEIGHT: 249 LBS | DIASTOLIC BLOOD PRESSURE: 80 MMHG | SYSTOLIC BLOOD PRESSURE: 126 MMHG | OXYGEN SATURATION: 97 % | TEMPERATURE: 97 F | BODY MASS INDEX: 36.88 KG/M2

## 2023-08-22 DIAGNOSIS — C44.712 BASAL CELL CARCINOMA OF RIGHT LOWER LEG: Primary | ICD-10-CM

## 2023-08-22 DIAGNOSIS — D04.5 SQUAMOUS CELL CARCINOMA IN SITU (SCCIS) OF SKIN OF CHEST: ICD-10-CM

## 2023-08-22 PROCEDURE — 17313 MOHS 1 STAGE T/A/L: CPT | Performed by: STUDENT IN AN ORGANIZED HEALTH CARE EDUCATION/TRAINING PROGRAM

## 2023-08-22 PROCEDURE — 99214 OFFICE O/P EST MOD 30 MIN: CPT | Performed by: STUDENT IN AN ORGANIZED HEALTH CARE EDUCATION/TRAINING PROGRAM

## 2023-08-22 RX ORDER — FLUOROURACIL 50 MG/ML
SOLUTION TOPICAL
Qty: 10 ML | Refills: 1 | Status: SHIPPED | OUTPATIENT
Start: 2023-08-22

## 2023-08-22 RX ADMIN — Medication: at 17:07

## 2023-08-22 NOTE — LETTER
2023     Adeline Gonzalez PA-C  [address] [address] [facility]    Patient: Christian Jauregui. YOB: 1945   Date of Visit: 2023       Dear Adeline Gonzalez: Thank you for referring Felix Manzo to me for evaluation. Below are my notes for this consultation. If you have questions, please do not hesitate to call me. I look forward to following your patient along with you. Sincerely,        Aurelio Lua MD        CC: No Recipients    Aurelio Lua MD  2023  5:15 PM  Incomplete  MOHS Procedure Note    Patient: Christian Jauregui. : 1945  MRN: [id number]  Date: 2023    History of Present Illness: The patient is a 78 y.o. male who presents with complaints of Basal cell carcinoma nodular type of the right distal pretibial region.      Past Medical History:   Diagnosis Date   • Asthma    • Basal cell carcinoma 2022    Left central frontal scalp   • Basal cell carcinoma 05/15/2023    right distal pretibial region   • Benign prostatic hyperplasia    • Hypercholesteremia    • Hypertension    • Urinary retention        Past Surgical History:   Procedure Laterality Date   • COLONOSCOPY     • CYSTOSCOPY     • HERNIA REPAIR     • KNEE ARTHROPLASTY     • KNEE SURGERY      left   • MOHS SURGERY Right 2023    BCC right distal pretibial region-Dr Garcia   • [place] CYSTO INSERTION TRANSPROSTATIC IMPLANT SINGLE N/A 2021    Procedure: CYSTOSCOPY WITH INSERTION UROLIFT;  Surgeon: Ruy Finch MD;  [facility]: [facility] MA[facility];  Service: Urology   • TOE SURGERY           Current Outpatient Medications:   •  acetaminophen (TYLENOL) 325 mg tablet, Take 2 tablets (650 mg total) by mouth every 4 (four) hours as needed for mild pain, Disp: 30 tablet, Rfl: 0  •  albuterol (PROVENTIL HFA,VENTOLIN HFA) 90 mcg/act inhaler, Inhale 1 puff as needed , Disp: , Rfl:   •  Ascorbic Acid (VITAMIN C) 100 MG tablet, Take 100 mg by mouth daily, Disp: , Rfl: •  aspirin 81 MG tablet, Take 81 mg by mouth daily, Disp: , Rfl:   •  atorvastatin (LIPITOR) 10 mg tablet, Take 10 mg by mouth, Disp: , Rfl:   •  B Complex-C-Folic Acid (NEPHROCAPS PO), Take 1 capsule by mouth, Disp: , Rfl:   •  esomeprazole (NexIUM) 40 mg packet, Take 40 mg by mouth, Disp: , Rfl:   •  finasteride (PROSCAR) 5 mg tablet, Take 5 mg by mouth daily, Disp: , Rfl:   •  hydrochlorothiazide (HYDRODIURIL) 25 mg tablet, Take 25 mg by mouth, Disp: , Rfl:   •  metoprolol succinate (TOPROL-XL) 50 mg 24 hr tablet, Take 50 mg by mouth, Disp: , Rfl:   •  multivitamin (THERAGRAN) TABS, Take 1 tablet by mouth daily, Disp: , Rfl:   •  Omega-3 Fatty Acids (fish oil) 1,000 mg, Fish Oil, Disp: , Rfl:   •  tadalafil (CIALIS) 20 MG tablet, Take 1 tablet (20 mg total) by mouth daily as needed for erectile dysfunction, Disp: 10 tablet, Rfl: 3  •  budesonide (Pulmicort Flexhaler) 90 MCG/ACT inhaler, as needed (Patient not taking: Reported on 8/22/2023), Disp: , Rfl:   •  docusate sodium (COLACE) 100 mg capsule, Take 1 capsule (100 mg total) by mouth 2 (two) times a day for 15 days (Patient not taking: Reported on 6/10/2022), Disp: 30 capsule, Rfl: 0  •  fluticasone (Flovent HFA) 110 MCG/ACT inhaler, Inhale 1 puff as needed  (Patient not taking: Reported on 8/22/2023), Disp: , Rfl:   •  guaifenesin-codeine (GUAIFENESIN AC) 100-10 MG/5ML liquid, Take 5 mL by mouth as needed  (Patient not taking: Reported on 8/22/2023), Disp: , Rfl:   •  naproxen (NAPROSYN) 500 mg tablet, Take 1 tablet (500 mg total) by mouth 2 (two) times a day with meals for 5 days For pain (Patient not taking: Reported on 6/10/2022), Disp: 10 tablet, Rfl: 0    Allergies   Allergen Reactions   • Dust Mite Extract Itching   • Mixed Ragweed Itching   • Molds & Smuts Itching   • Chloromycetin [Chloramphenicol] Hives       Physical Exam:   Vitals:    08/22/23 0913   BP: 126/80   Pulse: 60   Temp: (!) 97 °F (36.1 °C)   SpO2: 97%     General: Awake, Alert, Oriented x 3, Mood and affect appropriate  Respiratory: Respirations even and unlabored  Cardiovascular: Peripheral pulses intact; no edema  Musculoskeletal Exam: n/a    Skin: 1.5 cm x 1.5 cm pink scar. Assessment: Biopsy confirmed basal cell carcinoma of the right distal pretibial region. Plan: mohs    Time of H&P Completion:0945    MOHS Procedure Timeout      Flowsheet Row Most Recent Value   Timeout: 2938   Patient Identity Verified: Yes   Correct Site Verified: Yes   Correct Procedure Verified: Yes            MOHS Diagnosis/Indication/Location/ID      Flowsheet Row Most Recent Value   Pathology Type Basal cell carcinoma   Anatomic Site --  [right distal pretibial region]   Indications for MOHS tumor location   MOHS ID [id number]            MOHS Site/Accession/Pre-Post      Flowsheet Row Most Recent Value   Original Site Identified (as submitted by referring clinician) Photo, Referral   Biopsy Accession/Specimen # (as submitted by referring clincian) [id number]   Pre-MOHS Size Length (cm) 1.5   Pre-MOHS Size Width (cm) 1.5   Post-MOHS Size-Length (cm) 1.8   Post MOHS Size-Width (cm) 1.5   Repair Type Second intention   Anesthetic Used 1% Lidocaine with epinephrine  [9.4cc with bicarb]            MOHS Tumor Stage 1 Information      Flowsheet Row Most Recent Value   Tissue Sections (blocks) 1   Microscopic Exam Section 1: No tumor identified in section. Tumor Clear After Stage I? Yes                Patient identified, procedure verified, site identified and verified. Time out completed. Surgical removal of the lesion discussed with the patient (risks and benefits, including possibility of scarring, infection, recurrence or potential for further treatment)  I have specifically identified the site with the patient. I have discussed the fact that the patient will have a scar after the procedure regardless of granulation or repair with sutures.  I have discussed that the repair options can range from granulation in some cases to linear or curvilinear closures to larger flaps or grafts. There are sometimes flaps or grafts used that require multiples stages of surgery and will not be completed today, rather be completed over a series of appointments. I have discussed that occasionally due to location, size or depth of the lesion I may recommend consultation with and transfer of care for further removal or the reconstruction to another provider such as ophthalmology surgery, plastic surgery, ENT surgery, or surgical oncology. There are cases in which other testing such as imaging with MRI or CT scan or testing of lymph nodes is recommended because of the nature/depth/location of tumor seen during the removal. There is a risk of injury to nerves causing temporary or permanent numbness or the inability to move muscles full such as the inability to lift eyebrows. Questions answered and verbal and written consent was obtained. The tumor qualifies for Mohs based on AUC criteria. Dr. Shivani Avila served as the surgeon and pathologist during the procedure. After discussion with the patient regarding the various options, the best wound management decision of secondary intent was agreed on for optimal results. The patient tolerated the procedure well. The wound was dressed with petrolatum, a non-stick pad, and a compression dressing. Wound care was discussed with the patient, and a wound care instruction sheet was given. Postoperative care: Wound care discussed at length. I urged the patient to call us if any problems or question should arise. Complications: none  Post-op medications: none  Patient condition after procedure: stable  Discharge plans: Plan for wound check 2-3 months. SCCis, biospy proven, right lateral superior chest  Physical Exam:  Anatomic Location Affected & Morphological Description:  Well healed hypopigmented patch on the right chest  Pertinent Positives:  Pertinent Negatives:           Additional History of Present Condition:  Recently biopsied by [facility] provider and healed up well post biopsy. Notes he was told we would be discussign further treatment of this lesion with him as well. Assessment and Plan:  Based on a thorough discussion of this condition and the management approach to it (including a comprehensive discussion of the known risks, side effects and potential benefits of treatment), the patient (family) agrees to implement the following specific plan:    Discussed destruction with ED&C or C&C vs topical field therapy. Elects for field therapy. • Start  Efudex solution twice daily for 8 weeks to the right chest. It does NOT need to be washed off. It will be absorbed by your skin. • Avoid applying anything else to treated area for 1 hour after. One hour or so after application of Efudex, use plain VASELINE or AQUAPHOR over entire treated area. This is especially important once you start to have areas react to the Efudex (red, open areas). It helps treatment be more tolerable and speeds healing! • Continue use of plain VASELINE or AQUAPHOR over entire treated area after you complete treatment until the area is healed. • Wash hands after use. Keep out of reach of children and pets.

## 2023-08-22 NOTE — PROGRESS NOTES
MOHS Procedure Note    Patient: Josh De La Cruz. : 1945  MRN: [id number]  Date: 2023    History of Present Illness: The patient is a 78 y.o. male who presents with complaints of Basal cell carcinoma nodular type of the right distal pretibial region.      Past Medical History:   Diagnosis Date   • Asthma    • Basal cell carcinoma 2022    Left central frontal scalp   • Basal cell carcinoma 05/15/2023    right distal pretibial region   • Benign prostatic hyperplasia    • Hypercholesteremia    • Hypertension    • Urinary retention        Past Surgical History:   Procedure Laterality Date   • COLONOSCOPY     • CYSTOSCOPY     • HERNIA REPAIR     • KNEE ARTHROPLASTY     • KNEE SURGERY      left   • MOHS SURGERY Right 2023    BCC right distal pretibial region-Dr Madelyne Paget   • [place] CYSTO INSERTION TRANSPROSTATIC IMPLANT SINGLE N/A 2021    Procedure: CYSTOSCOPY WITH INSERTION UROLIFT;  Surgeon: Joycelyn Herrmann MD;  [facility];  Service: Urology   • TOE SURGERY           Current Outpatient Medications:   •  acetaminophen (TYLENOL) 325 mg tablet, Take 2 tablets (650 mg total) by mouth every 4 (four) hours as needed for mild pain, Disp: 30 tablet, Rfl: 0  •  albuterol (PROVENTIL HFA,VENTOLIN HFA) 90 mcg/act inhaler, Inhale 1 puff as needed , Disp: , Rfl:   •  Ascorbic Acid (VITAMIN C) 100 MG tablet, Take 100 mg by mouth daily, Disp: , Rfl:   •  aspirin 81 MG tablet, Take 81 mg by mouth daily, Disp: , Rfl:   •  atorvastatin (LIPITOR) 10 mg tablet, Take 10 mg by mouth, Disp: , Rfl:   •  B Complex-C-Folic Acid (NEPHROCAPS PO), Take 1 capsule by mouth, Disp: , Rfl:   •  esomeprazole (NexIUM) 40 mg packet, Take 40 mg by mouth, Disp: , Rfl:   •  finasteride (PROSCAR) 5 mg tablet, Take 5 mg by mouth daily, Disp: , Rfl:   •  hydrochlorothiazide (HYDRODIURIL) 25 mg tablet, Take 25 mg by mouth, Disp: , Rfl:   •  metoprolol succinate (TOPROL-XL) 50 mg 24 hr tablet, Take 50 mg by mouth, Disp: , Rfl:   •  multivitamin (THERAGRAN) TABS, Take 1 tablet by mouth daily, Disp: , Rfl:   •  Omega-3 Fatty Acids (fish oil) 1,000 mg, Fish Oil, Disp: , Rfl:   •  tadalafil (CIALIS) 20 MG tablet, Take 1 tablet (20 mg total) by mouth daily as needed for erectile dysfunction, Disp: 10 tablet, Rfl: 3  •  budesonide (Pulmicort Flexhaler) 90 MCG/ACT inhaler, as needed (Patient not taking: Reported on 8/22/2023), Disp: , Rfl:   •  docusate sodium (COLACE) 100 mg capsule, Take 1 capsule (100 mg total) by mouth 2 (two) times a day for 15 days (Patient not taking: Reported on 6/10/2022), Disp: 30 capsule, Rfl: 0  •  fluticasone (Flovent HFA) 110 MCG/ACT inhaler, Inhale 1 puff as needed  (Patient not taking: Reported on 8/22/2023), Disp: , Rfl:   •  guaifenesin-codeine (GUAIFENESIN AC) 100-10 MG/5ML liquid, Take 5 mL by mouth as needed  (Patient not taking: Reported on 8/22/2023), Disp: , Rfl:   •  naproxen (NAPROSYN) 500 mg tablet, Take 1 tablet (500 mg total) by mouth 2 (two) times a day with meals for 5 days For pain (Patient not taking: Reported on 6/10/2022), Disp: 10 tablet, Rfl: 0    Allergies   Allergen Reactions   • Dust Mite Extract Itching   • Mixed Ragweed Itching   • Molds & Smuts Itching   • Chloromycetin [Chloramphenicol] Hives       Physical Exam:   Vitals:    08/22/23 0913   BP: 126/80   Pulse: 60   Temp: (!) 97 °F (36.1 °C)   SpO2: 97%     General: Awake, Alert, Oriented x 3, Mood and affect appropriate  Respiratory: Respirations even and unlabored  Cardiovascular: Peripheral pulses intact; no edema  Musculoskeletal Exam: n/a    Skin: 1.5 cm x 1.5 cm pink scar on the right distal pretibial leg    Assessment: Biopsy confirmed basal cell carcinoma of the right distal pretibial region.      Plan: mohs    Time of H&P Completion:0945    MOHS Procedure Timeout    Flowsheet Row Most Recent Value   Timeout: 2580   Patient Identity Verified: Yes   Correct Site Verified: Yes   Correct Procedure Verified: Yes          MOHS Diagnosis/Indication/Location/ID    Flowsheet Row Most Recent Value   Pathology Type Basal cell carcinoma   Anatomic Site --  [right distal pretibial region]   Indications for MOHS tumor location   MOHS ID [id number]          MOHS Site/Accession/Pre-Post    Flowsheet Row Most Recent Value   Original Site Identified (as submitted by referring clinician) Photo, Referral   Biopsy Accession/Specimen # (as submitted by referring clincian) [id number]   Pre-MOHS Size Length (cm) 1.5   Pre-MOHS Size Width (cm) 1.5   Post-MOHS Size-Length (cm) 1.8   Post MOHS Size-Width (cm) 1.5   Repair Type Second intention   Anesthetic Used 1% Lidocaine with epinephrine  [9.4cc with bicarb]          MOHS Tumor Stage 1 Information    Flowsheet Row Most Recent Value   Tissue Sections (blocks) 1   Microscopic Exam Section 1: No tumor identified in section. Tumor Clear After Stage I? Yes              Patient identified, procedure verified, site identified and verified. Time out completed. Surgical removal of the lesion discussed with the patient (risks and benefits, including possibility of scarring, infection, recurrence or potential for further treatment)  I have specifically identified the site with the patient. I have discussed the fact that the patient will have a scar after the procedure regardless of granulation or repair with sutures. I have discussed that the repair options can range from granulation in some cases to linear or curvilinear closures to larger flaps or grafts. There are sometimes flaps or grafts used that require multiples stages of surgery and will not be completed today, rather be completed over a series of appointments. I have discussed that occasionally due to location, size or depth of the lesion I may recommend consultation with and transfer of care for further removal or the reconstruction to another provider such as ophthalmology surgery, plastic surgery, ENT surgery, or surgical oncology.  There are cases in which other testing such as imaging with MRI or CT scan or testing of lymph nodes is recommended because of the nature/depth/location of tumor seen during the removal. There is a risk of injury to nerves causing temporary or permanent numbness or the inability to move muscles full such as the inability to lift eyebrows. Questions answered and verbal and written consent was obtained. The tumor qualifies for Mohs based on AUC criteria. Dr. Nayeli Morrison served as the surgeon and pathologist during the procedure. After discussion with the patient regarding the various options, the best wound management decision of secondary intent was agreed on for optimal results. The patient tolerated the procedure well. The wound was dressed with petrolatum, a non-stick pad, and a compression dressing. Wound care was discussed with the patient, and a wound care instruction sheet was given. Postoperative care: Wound care discussed at length. I urged the patient to call us if any problems or question should arise. Complications: none  Post-op medications: none  Patient condition after procedure: stable  Discharge plans: Plan for wound check 2-3 months. SCCis, biospy proven, right lateral superior chest  Physical Exam:  • Anatomic Location Affected & Morphological Description:  Well healed hypopigmented patch on the right chest  • Pertinent Positives:  • Pertinent Negatives: Additional History of Present Condition:  [facility] up well post biopsy. Notes he was told we would be discussign further treatmetn of this lesion with him as well.      Assessment and Plan:  Based on a thorough discussion of this condition and the management approach to it (including a comprehensive discussion of the known risks, side effects and potential benefits of treatment), the patient (family) agrees to implement the following specific plan:    • Discussed destruction with ED&C or C&C vs topical field therapy. Elects for field therapy. • Start  Efudex solution twice daily for 8 weeks to the right chest. It does NOT need to be washed off. It will be absorbed by your skin. • Avoid applying anything else to treated area for 1 hour after. One hour or so after application of Efudex, use plain VASELINE or AQUAPHOR over entire treated area. This is especially important once you start to have areas react to the Efudex (red, open areas). It helps treatment be more tolerable and speeds healing! • Continue use of plain VASELINE or AQUAPHOR over entire treated area after you complete treatment until the area is healed. • Wash hands after use. Keep out of reach of children and pets. EFUDEX (5-Fluorouracil, 5-FU)     • Efudex is a chemotherapy medication when taken by mouth; however, in dermatology we use it to treat skin conditions such as warts, some skin cancers and most commonly actinic (solar) keratoses. When applied topically the medication is not absorbed into the bloodstream and does not cause typical chemotherapy side effects. It is safe to your body. • Efudex works by destroying the damaged cells on your skin. As a result it causes redness, irritation, and scabbing. This is a normal part of therapeutic skin response to Efudex. • The treated areas need to be completely protected from the sun during the treatment and the recovery process. If you need to go out into the sun, cover the area with a hat, long sleeve shirts, gloves, etc..  • Treatment approaches very depending on the condition that is being treated. Your doctor will specify which approach is right for you. INSTRUCTIONS FOR USE    Treating an Area of Skin  • This approach is used when you have widespread involvement over an area such as the scalp, face, or forearms. • Usually, in this case, Efudex is applied twice a day. • The duration of treatment depends on the skin condition and the specific anatomic area being treated. Typically treatment extends for 2-3 weeks for the face versus 4-6 weeks for the scalp, arms and legs. What to Expect During Therapy  • Redness  o This caused by inflammation and destruction of the abnormal cells and indicates your lesions are responding to the treatment.   o You may use an over-the-counter hydrocortisone 1% ointment to decrease redness, although the treatment may be somewhat more effective if you are able to avoid hydrocortisone use. It is preferable to use this after completion of therapy.  o It usually takes 2-4 weeks for the redness to fade after stopping the Efudex; most of the redness resolves over the first 1-2 weeks. • Crusting and peeling   o This occurs as the sun-damaged skin is removed to allow for replacement by normal skin. o Cool washcloth soaks (washcloth soaked with cool water over face for 15-20 minutes 3-4 times per day) can help as well as a moisturizing ointment, such as Vaseline or Aquaphor ointment.   o It is preferable to be moisturized rather than have the skin be dry.   o It is fine to wash your face with plain water or a mild cleanser such as Dove bar or Cetaphil Gentle Facial Cleanser during your treatment course. • Itching and pain   o This can be controlled with acetaminophen (Tylenol) or non-steroidal anti- inflammatory medication (ibuprofen, naproxen)     Some PRO" Tips…   • You can apply the Efudex with a Q-tip (if spot treating), gloves, or fingers. If fingers are used, however, remember to wash your hands thoroughly to avoid irritation on normal skin. • Care must be taken with Efudex during sallie months because the medicine is reactive with the sun. DO NOT USE EFUDEX IN SUMMER MONTHS unless specifically directed to by your doctor. • Do NOT use Efudex on eyelids or lips unless specifically directed to do so. • Care must be taken with Efudex in skin fold areas like the fold from the nose to the corner of the mouth.  Try to avoid having any Efudex accumulate in those areas. • Not all "bumps" will respond to the Efudex. There are non-cancerous ("benign") types of other keratoses that will not react to the medication (such as seborrheic keratoses). If the area treated is not getting red it does not mean the medicine is not working. • Consider the Efudex as an investment in the health of your skin:  The benefits of “sticking it out” are worth it! Efudex is an excellent way to reverse many years of sun damage. When to Contact Your Dermatologist   • Once you have completed your prescribed course of therapy, wait for a period of 4 weeks to see if treated areas resolve. If you have lesions that have not responded, make a follow-up appointment with your doctor in about 8 weeks. • In evaluating unresponsive areas your doctor will need the skin completely “settled down,” and this usually takes a period of at least two months. • If you are having extensive burning, itching, swelling or tenderness call [facility] at [phone] ([phone]) to arrange an Efudex check with our clinical nurse, who has extensive experience with Efudex treatment. Although the Efudex treatment sometimes produces dramatic redness, crusting and peeling, problems such as allergic reactions and infection are rare and should be evaluated promptly.          Scribe Attestation    I,:  Alexandra Hurst am acting as a scribe while in the presence of the attending physician.:       I,:  Juan Fuller MD personally performed the services described in this documentation    as scribed in my presence.:

## 2023-08-22 NOTE — PATIENT INSTRUCTIONS
Mohs Microscopic Surgery After Care    Your wound will heal via secondary intention, which means no additional surgery was performed after removal of your skin cancer. The wound was left open to heal gradually over time using wound care alone. Wounds left to heal secondarily can take 2-3 months on average to heal.  The healing occurs step by step. You will notice that over the first three weeks the area will drain straw colored fluid that may have some blood within it. This is normal. Over the first three weeks, you form a nice healing base called fibrin that serves as the scaffold or map for the rest of your body's healing process. The fibrin is a thick yellow tissue. People often worry that it is pus given the yellow color. Unlike pus, this is a thick layer that cannot be rubbed off. After this, you should expect the wound to "fill in" to the surface of your skin over the course of several weeks. Lastly, a new layer of skin will form over the wound. Until your body forms a good fibrin base (which takes ~3 weeks) you should avoid immersing the wound in water (such as in baths, whirlpools, swimming pools, hot tubs, lakes and oceans). You however CAN and should wash the area daily, as instructed below. After healing, the scar will initially be red (and often times a deep red to purple color on the legs) but will gradually fade over the course of 1 year to a round scar lighter than the skin surrounding it. We advise you follow the wound care as noted below the entire time it takes for the areas to heal completely. WOUND CARE AFTER YOUR PROCEDURE    Try NOT to remove the pressure bandage for 48 hours. Keep the area clean and dry while this bandage is on. After removing the bandage for the first time, gently clean the area with soap and water. If the bandage is difficult to remove, getting the bandage wet in the shower will sometimes help soften the adhesive and allow it to be removed more easily. You will now need to cleanse this area daily in the shower with gentle soap. There is no need to scrub the area. You will need to apply plain Mupirocin ointment (the prescription dispensed at visit) to the site until it has healed over completely followed by a clean appropriately sized bandage to area. Non stick dressing and paper tape (or Hypafix) are recommended for sensitive skin but a bandaid is fine if it covers the area well. COMPRESSION STOCKINGS   After surgery on the legs, we advise people to elevate their leg whenever possible and start using compression stockings (at least to the leg we did surgery on) to optimize the healing environment. We recommend wearing these as long as possible/tolerated throughout the day. You may purchase these over the counter. They should be 15-30 mm Hg compression level. You can purchase these within several pharmacies, or online at:   [employer]       MANAGING YOUR PAIN AFTER SURGERY     You can expect to have some pain after surgery. This is normal. The pain is typically worse the first two days after surgery, and quickly begins to get better. The best strategy for controlling your pain after surgery is around the clock pain control. You can take over the counter Acetaminophen (Tylenol) for discomfort, if no contraindications. If you are taking this at the maximum dose, you can alternate this with Motrin (ibuprofen or Advil) as well. Alternating these medications with each other allows you to maximize your pain control. In addition to Tylenol and Motrin, you can use heating pads or ice packs on your incisions to help reduce your pain. How will I alternate your regular strength over-the-counter pain medication? You will take a dose of pain medication every three hours.    Start by taking 650 mg of Tylenol (2 pills of 325 mg)   3 hours later take 600 mg of Motrin (3 pills of 200 mg)   3 hours after taking the Motrin take 650 mg of Tylenol   3 hours after that take 600 mg of Motrin. See example - if your first dose of Tylenol is at 12:00 PM     12:00 PM  Tylenol 650 mg (2 pills of 325 mg)    3:00 PM  Motrin 600 mg (3 pills of 200 mg)    6:00 PM  Tylenol 650 mg (2 pills of 325 mg)    9:00 PM  Motrin 600 mg (3 pills of 200 mg)    Continue alternating every 3 hours      Important:   Do not take more than 4000mg of Tylenol or 3200mg of Motrin in a 24-hour period. What if I still have pain? If you have pain that is not controlled with the over-the-counter pain medications (Tylenol and Motrin or Advil), don't hesitate to call our staff using the number provided. We will help make sure you are managing your pain in the best way possible, and if necessary, we can provide a prescription for additional pain medication. CALL OUR OFFICE IMMEDIATELY FOR ANY SIGNS OF INFECTION:    This includes fever, chills, increased redness, warmth, tenderness, severe discomfort/pain, or pus or foul smell coming from the wound. If you are experiencing any of the above, please call [facility] directly at [phone] (SKIN)    IF BLEEDING IS NOTICED:    Place a clean cloth over the area and apply firm pressure for thirty minutes. Check the wound ONLY after 30 minutes of direct pressure; do not cheat and sneak a peak, as that does not count. If bleeding persists after 30 minutes of legitimate direct pressure, then try one more round of direct pressure to the area. Should the bleeding become heavier or not stop after the second attempt, call [facility] directly at [phone] (SKIN). Your call will get routed to the dermatology surgeon on call even after hours.

## 2023-08-26 ENCOUNTER — OFFICE VISIT (OUTPATIENT)
Dept: URGENT CARE | Age: 78
End: 2023-08-26
Payer: MEDICARE

## 2023-08-26 VITALS
HEART RATE: 79 BPM | TEMPERATURE: 97.8 F | OXYGEN SATURATION: 96 % | SYSTOLIC BLOOD PRESSURE: 162 MMHG | RESPIRATION RATE: 18 BRPM | DIASTOLIC BLOOD PRESSURE: 75 MMHG

## 2023-08-26 DIAGNOSIS — R39.9 UTI SYMPTOMS: Primary | ICD-10-CM

## 2023-08-26 LAB
SL AMB  POCT GLUCOSE, UA: NEGATIVE
SL AMB LEUKOCYTE ESTERASE,UA: ABNORMAL
SL AMB POCT BILIRUBIN,UA: ABNORMAL
SL AMB POCT BLOOD,UA: ABNORMAL
SL AMB POCT CLARITY,UA: ABNORMAL
SL AMB POCT COLOR,UA: ABNORMAL
SL AMB POCT KETONES,UA: 160
SL AMB POCT NITRITE,UA: POSITIVE
SL AMB POCT PH,UA: 6.5
SL AMB POCT SPECIFIC GRAVITY,UA: 1.02
SL AMB POCT URINE PROTEIN: ABNORMAL
SL AMB POCT UROBILINOGEN: 0.2

## 2023-08-26 PROCEDURE — 87086 URINE CULTURE/COLONY COUNT: CPT

## 2023-08-26 PROCEDURE — 87186 SC STD MICRODIL/AGAR DIL: CPT

## 2023-08-26 PROCEDURE — 87147 CULTURE TYPE IMMUNOLOGIC: CPT

## 2023-08-26 PROCEDURE — 99213 OFFICE O/P EST LOW 20 MIN: CPT

## 2023-08-26 PROCEDURE — G0463 HOSPITAL OUTPT CLINIC VISIT: HCPCS

## 2023-08-26 PROCEDURE — 81002 URINALYSIS NONAUTO W/O SCOPE: CPT

## 2023-08-26 PROCEDURE — 87077 CULTURE AEROBIC IDENTIFY: CPT

## 2023-08-26 RX ORDER — CEFUROXIME AXETIL 500 MG/1
500 TABLET ORAL EVERY 12 HOURS SCHEDULED
Qty: 14 TABLET | Refills: 0 | Status: SHIPPED | OUTPATIENT
Start: 2023-08-26 | End: 2023-09-02

## 2023-08-26 RX ORDER — AMOXICILLIN AND CLAVULANATE POTASSIUM 875; 125 MG/1; MG/1
1 TABLET, FILM COATED ORAL EVERY 12 HOURS SCHEDULED
Qty: 20 TABLET | Refills: 0 | Status: SHIPPED | OUTPATIENT
Start: 2023-08-26 | End: 2023-08-26 | Stop reason: CLARIF

## 2023-08-26 NOTE — PROGRESS NOTES
[facility]        NAME: David Crain. is a 78 y.o. male  : 1945    MRN: [id number]  DATE: 2023  TIME: 5:55 PM    Assessment and Plan   UTI symptoms [R39.9]  1. UTI symptoms  POCT urine dip    Urine culture    cefuroxime (CEFTIN) 500 mg tablet    DISCONTINUED: amoxicillin-clavulanate (AUGMENTIN) 875-125 mg per tablet      Urine dip results suggest UTI, urine culture results pending and should be available in [facility] within 48-72 hours. Please begin antibiotics as directed. Ensure good hydration. Follow up with Urology as soon as possible. Patient Instructions   Urinary Tract Infection in Men   WHAT YOU NEED TO KNOW:   A urinary tract infection (UTI) is caused by bacteria that get inside your urinary tract. Your urinary tract includes your kidneys, ureters, bladder, and urethra. A UTI is more common in your lower urinary tract, which includes your bladder and urethra.        DISCHARGE INSTRUCTIONS:   Return to the emergency department if:   • You are urinating very little or not at all.     • You have a high fever with shaking chills.     • You have side or back pain that gets worse.     Call your doctor if:   • You have a fever.     • You do not feel better after 2 days of taking antibiotics.     • You are vomiting.     • You have new symptoms, such as blood or pus in your urine.     • You have questions or concerns about your condition or care.     Medicines:   • Antibiotics  treat a bacterial infection.     • Medicines  may be given to decrease pain and burning when you urinate. They will also help decrease the feeling that you need to urinate often. These medicines may make your urine orange or red.     • Take your medicine as directed. Contact your healthcare provider if you think your medicine is not helping or if you have side effects. Tell your provider if you are allergic to any medicine. Keep a list of the medicines, vitamins, and herbs you take.  Include the amounts, and when and why you take them. Bring the list or the pill bottles to follow-up visits. Carry your medicine list with you in case of an emergency.     Prevent another UTI:   • Empty your bladder often. Urinate and empty your bladder as soon as you feel the need. Do not hold your urine for long periods of time.     • Drink liquids as directed. Ask how much liquid to drink each day and which liquids are best for you. You may need to drink more liquids than usual to help flush out the bacteria. Do not drink alcohol, caffeine, or citrus juices. These can irritate your bladder and increase your symptoms. Your healthcare provider may recommend cranberry juice to help prevent a UTI.     • Urinate after you have sex. This can help flush out bacteria passed during sex.     • Do pelvic muscle exercises often. Pelvic muscle exercises may help you start and stop urinating. Strong pelvic muscles may help you empty your bladder easier. Squeeze these muscles tightly for 5 seconds like you are trying to hold back urine. Then relax for 5 seconds. Gradually work up to squeezing for 10 seconds. Do 3 sets of 15 repetitions a day, or as directed.     Follow up with your doctor as directed:  Write down your questions so you remember to ask them during your visits. © Copyright Joshua Barlow 2022 Information is for End User's use only and may not be sold, redistributed or otherwise used for commercial purposes. The above information is an  only. It is not intended as medical advice for individual conditions or treatments. Talk to your doctor, nurse or pharmacist before following any medical regimen to see if it is safe and effective for you. Follow up with PCP in 3-5 days. Proceed to  ER if symptoms worsen.     Chief Complaint     Chief Complaint   Patient presents with   • Possible UTI     Patient with hx of UTI's states that about two days ago he developed groin pain, burning with urination, and some blood in the urine. He noticed a small particle around 1cm in length came out of his urethra while urinating. History of Present Illness       78year old male with PMH significant for BPH, urinary retention, recurrent UTIs, Urolift in July of 2022, who presents for evaluation of dysuria, urainary frequency/urgency since yesterday. He also noticed some blood and mucus in urine. He continues to follow with Urology. He denies fever, flank pain, concern for STI, n/v/d. Review of Systems   Review of Systems   Constitutional: Negative for fatigue and fever. HENT: Negative for congestion, ear discharge, ear pain, postnasal drip, rhinorrhea, sinus pressure, sinus pain, sneezing and sore throat. Eyes: Negative. Negative for pain, discharge, redness and itching. Respiratory: Negative. Negative for apnea, cough, choking, chest tightness, shortness of breath and stridor. Cardiovascular: Negative. Negative for chest pain and palpitations. Gastrointestinal: Negative. Negative for diarrhea, nausea and vomiting. Endocrine: Negative. Negative for polydipsia, polyphagia and polyuria. Genitourinary: Positive for dysuria, frequency and urgency. Negative for decreased urine volume, difficulty urinating, enuresis, flank pain, genital sores, hematuria, penile discharge, penile pain, penile swelling, scrotal swelling and testicular pain. Musculoskeletal: Negative. Negative for arthralgias, back pain, myalgias, neck pain and neck stiffness. Skin: Negative for color change, rash and wound. Allergic/Immunologic: Negative. Negative for environmental allergies. Neurological: Negative. Negative for dizziness, facial asymmetry, light-headedness, numbness and headaches. Hematological: Negative. Negative for adenopathy. Psychiatric/Behavioral: Negative.           Current Medications       Current Outpatient Medications:   •  acetaminophen (TYLENOL) 325 mg tablet, Take 2 tablets (650 mg total) by mouth every 4 (four) hours as needed for mild pain, Disp: 30 tablet, Rfl: 0  •  albuterol (PROVENTIL HFA,VENTOLIN HFA) 90 mcg/act inhaler, Inhale 1 puff as needed , Disp: , Rfl:   •  Ascorbic Acid (VITAMIN C) 100 MG tablet, Take 100 mg by mouth daily, Disp: , Rfl:   •  aspirin 81 MG tablet, Take 81 mg by mouth daily, Disp: , Rfl:   •  atorvastatin (LIPITOR) 10 mg tablet, Take 10 mg by mouth, Disp: , Rfl:   •  B Complex-C-Folic Acid (NEPHROCAPS PO), Take 1 capsule by mouth, Disp: , Rfl:   •  cefuroxime (CEFTIN) 500 mg tablet, Take 1 tablet (500 mg total) by mouth every 12 (twelve) hours for 7 days, Disp: 14 tablet, Rfl: 0  •  esomeprazole (NexIUM) 40 mg packet, Take 40 mg by mouth, Disp: , Rfl:   •  finasteride (PROSCAR) 5 mg tablet, Take 5 mg by mouth daily, Disp: , Rfl:   •  Fluorouracil 5 % SOLN, Apply topically twice daily for eight weeks to the right chest. Wash hands before and after use., Disp: 10 mL, Rfl: 1  •  fluticasone (Flovent HFA) 110 MCG/ACT inhaler, Inhale 1 puff as needed, Disp: , Rfl:   •  hydrochlorothiazide (HYDRODIURIL) 25 mg tablet, Take 25 mg by mouth, Disp: , Rfl:   •  metoprolol succinate (TOPROL-XL) 50 mg 24 hr tablet, Take 50 mg by mouth, Disp: , Rfl:   •  multivitamin (THERAGRAN) TABS, Take 1 tablet by mouth daily, Disp: , Rfl:   •  Omega-3 Fatty Acids (fish oil) 1,000 mg, Fish Oil, Disp: , Rfl:   •  tadalafil (CIALIS) 20 MG tablet, Take 1 tablet (20 mg total) by mouth daily as needed for erectile dysfunction, Disp: 10 tablet, Rfl: 3  •  budesonide (Pulmicort Flexhaler) 90 MCG/ACT inhaler, as needed (Patient not taking: Reported on 8/22/2023), Disp: , Rfl:   •  docusate sodium (COLACE) 100 mg capsule, Take 1 capsule (100 mg total) by mouth 2 (two) times a day for 15 days (Patient not taking: Reported on 6/10/2022), Disp: 30 capsule, Rfl: 0  •  guaifenesin-codeine (GUAIFENESIN AC) 100-10 MG/5ML liquid, Take 5 mL by mouth as needed  (Patient not taking: Reported on 8/22/2023), Disp: , Rfl:   • naproxen (NAPROSYN) 500 mg tablet, Take 1 tablet (500 mg total) by mouth 2 (two) times a day with meals for 5 days For pain (Patient not taking: Reported on 6/10/2022), Disp: 10 tablet, Rfl: 0    Current Allergies     Allergies as of 08/26/2023 - Reviewed 08/26/2023   Allergen Reaction Noted   • Dust mite extract Itching 08/23/2021   • Mixed ragweed Itching 08/23/2021   • Molds & smuts Itching 08/23/2021   • Chloromycetin [chloramphenicol] Hives 08/27/2021            The following portions of the patient's history were reviewed and updated as appropriate: allergies, current medications, past family history, past medical history, past social history, past surgical history and problem list.     Past Medical History:   Diagnosis Date   • Asthma    • Basal cell carcinoma 04/06/2022    Left central frontal scalp   • Basal cell carcinoma 05/15/2023    right distal pretibial region   • Benign prostatic hyperplasia    • Hypercholesteremia    • Hypertension    • Urinary retention        Past Surgical History:   Procedure Laterality Date   • COLONOSCOPY     • CYSTOSCOPY     • HERNIA REPAIR     • KNEE ARTHROPLASTY     • KNEE SURGERY      left   • MOHS SURGERY Right 08/22/2023    BCC right distal pretibial region-Dr Garcia   • [place] CYSTO INSERTION TRANSPROSTATIC IMPLANT SINGLE N/A 08/27/2021    Procedure: CYSTOSCOPY WITH INSERTION Keysha Alvarado;  Surgeon: Nelson Sandy MD;  Location: [facility];  Service: Urology   • TOE SURGERY         Family History   Problem Relation Age of Onset   • Heart disease Father    • COPD Father    • Stroke Mother    • Lung cancer Mother    • No Known Problems Sister    • Deafness Sister    • No Known Problems Son    • No Known Problems Daughter    • No Known Problems Daughter    • No Known Problems Daughter          Medications have been verified. Objective   /75   Pulse 79   Temp 97.8 °F (36.6 °C)   Resp 18   SpO2 96%        Physical Exam     Physical Exam  Vitals reviewed. Constitutional:       General: He is not in acute distress. Appearance: Normal appearance. He is not ill-appearing, toxic-appearing or diaphoretic. HENT:      Head: Normocephalic and atraumatic. Right Ear: External ear normal.      Left Ear: External ear normal.      Nose: Nose normal.      Mouth/Throat:      Mouth: Mucous membranes are moist.   Eyes:      Extraocular Movements: Extraocular movements intact. Pupils: Pupils are equal, round, and reactive to light. Cardiovascular:      Rate and Rhythm: Normal rate and regular rhythm. Pulses: Normal pulses. Heart sounds: Normal heart sounds. No murmur heard. No friction rub. No gallop. Pulmonary:      Effort: Pulmonary effort is normal. No respiratory distress. Breath sounds: Normal breath sounds. No stridor. No wheezing, rhonchi or rales. Chest:      Chest wall: No tenderness. Abdominal:      General: Abdomen is flat. Bowel sounds are normal.      Palpations: Abdomen is soft. Tenderness: There is no abdominal tenderness. There is no right CVA tenderness or left CVA tenderness. Musculoskeletal:         General: Normal range of motion. Cervical back: Normal range of motion and neck supple. No rigidity or tenderness. Skin:     General: Skin is warm and dry. Capillary Refill: Capillary refill takes less than 2 seconds. Findings: No erythema. Neurological:      General: No focal deficit present. Mental Status: He is alert.    Psychiatric:         Mood and Affect: Mood normal.

## 2023-08-26 NOTE — PATIENT INSTRUCTIONS
Urine dip results suggest UTI, urine culture results pending and should be available in MyChart within 48-72 hours. Please begin antibiotics as directed. Ensure good hydration. Follow up with Urology as soon as possible.

## 2023-08-27 LAB — BACTERIA UR CULT: ABNORMAL

## 2023-08-28 LAB
BACTERIA UR CULT: ABNORMAL
BACTERIA UR CULT: ABNORMAL

## 2023-09-13 ENCOUNTER — PROCEDURE VISIT (OUTPATIENT)
Age: 78
End: 2023-09-13
Payer: MEDICARE

## 2023-09-13 VITALS
BODY MASS INDEX: 36.88 KG/M2 | HEIGHT: 69 IN | WEIGHT: 249 LBS | DIASTOLIC BLOOD PRESSURE: 84 MMHG | SYSTOLIC BLOOD PRESSURE: 136 MMHG

## 2023-09-13 DIAGNOSIS — M54.50 ACUTE LEFT-SIDED LOW BACK PAIN WITHOUT SCIATICA: ICD-10-CM

## 2023-09-13 DIAGNOSIS — M99.04 SEGMENTAL DYSFUNCTION OF SACRAL REGION: ICD-10-CM

## 2023-09-13 DIAGNOSIS — M99.03 SEGMENTAL DYSFUNCTION OF LUMBAR REGION: ICD-10-CM

## 2023-09-13 DIAGNOSIS — M54.16 LUMBAR RADICULOPATHY: ICD-10-CM

## 2023-09-13 DIAGNOSIS — M99.05 SEGMENTAL DYSFUNCTION OF PELVIC REGION: Primary | ICD-10-CM

## 2023-09-13 PROCEDURE — 98941 CHIROPRACT MANJ 3-4 REGIONS: CPT | Performed by: CHIROPRACTOR

## 2023-09-13 RX ORDER — BIMATOPROST 0.1 MG/ML
SOLUTION/ DROPS OPHTHALMIC
COMMUNITY
Start: 2023-09-08

## 2023-09-18 NOTE — PROGRESS NOTES
Date of first visit: 6/7/2023      HPI:  Abbi Barbosa returns for treatment, ongoing symptoms. Left low back and hip. Is improving. VPAS  4 only when moving certain ways. The following portions of the patient's history were reviewed and updated as appropriate: allergies, current medications, past family history, past medical history, past social history, past surgical history, and problem list.    Review of Systems    Physical Exam:  Pelvic obliquity leg length inequality. Joint dysfunction left SI, L5/S1    Assessment:   Diagnosis ICD-10-CM Associated Orders   1. Segmental dysfunction of pelvic region  M99.05       2. Lumbar radiculopathy  M54.16       3. Acute left-sided low back pain without sciatica  M54.50       4. Segmental dysfunction of sacral region  M99.04       5. Segmental dysfunction of lumbar region  M99.03                 Treatment: 81876  Manipulation left innominate, sacrum, and L5. Discussion:  Continue icing. Daily stretching. We will do a course of physical therapy when he arrives in [place]. See him back 1 more visit prior to him leaving.

## 2023-09-20 ENCOUNTER — PROCEDURE VISIT (OUTPATIENT)
Age: 78
End: 2023-09-20
Payer: MEDICARE

## 2023-09-20 VITALS
HEIGHT: 69 IN | DIASTOLIC BLOOD PRESSURE: 77 MMHG | WEIGHT: 249 LBS | BODY MASS INDEX: 36.88 KG/M2 | HEART RATE: 60 BPM | SYSTOLIC BLOOD PRESSURE: 142 MMHG

## 2023-09-20 DIAGNOSIS — M99.03 SEGMENTAL DYSFUNCTION OF LUMBAR REGION: ICD-10-CM

## 2023-09-20 DIAGNOSIS — M54.50 ACUTE LEFT-SIDED LOW BACK PAIN WITHOUT SCIATICA: ICD-10-CM

## 2023-09-20 DIAGNOSIS — M54.16 LUMBAR RADICULOPATHY: ICD-10-CM

## 2023-09-20 DIAGNOSIS — M99.04 SEGMENTAL DYSFUNCTION OF SACRAL REGION: ICD-10-CM

## 2023-09-20 DIAGNOSIS — M99.05 SEGMENTAL DYSFUNCTION OF PELVIC REGION: Primary | ICD-10-CM

## 2023-09-20 PROCEDURE — 98941 CHIROPRACT MANJ 3-4 REGIONS: CPT | Performed by: CHIROPRACTOR

## 2023-09-20 NOTE — PROGRESS NOTES
Date of first visit: 6/7/2023      HPI:  Bibi Schmidt returns for treatment, ongoing symptoms. Left low back and hip. VPAS  5 only when moving certain ways. He will be leaving for [place] where he will you will spend the next 2 months. The following portions of the patient's history were reviewed and updated as appropriate: allergies, current medications, past family history, past medical history, past social history, past surgical history, and problem list.    Review of Systems    Physical Exam:  Pelvic obliquity leg length inequality. Joint dysfunction left SI, L5/S1    Assessment:   Diagnosis ICD-10-CM Associated Orders   1. Segmental dysfunction of pelvic region  M99.05       2. Lumbar radiculopathy  M54.16       3. Acute left-sided low back pain without sciatica  M54.50 Ambulatory Referral to Physical Therapy      4. Segmental dysfunction of sacral region  M99.04       5. Segmental dysfunction of lumbar region  M99.03                 Treatment: 45324  Manipulation left innominate, sacrum, and L5. Discussion:  Continue icing. Daily stretching. We will do a course of physical therapy when he arrives in [place]. Prescription for physical therapy given to patient today. He is to concentrate on lower extremity flexibility core strengthening. He does have an orthopedist there if symptoms are not resolving recommend diagnostic imaging. Otherwise we will see him upon his return to the area.

## 2024-06-21 ENCOUNTER — TELEPHONE (OUTPATIENT)
Age: 79
End: 2024-06-21

## 2024-06-21 ENCOUNTER — APPOINTMENT (OUTPATIENT)
Dept: LAB | Facility: CLINIC | Age: 79
End: 2024-06-21
Payer: MEDICARE

## 2024-06-21 DIAGNOSIS — N40.1 BPH WITH OBSTRUCTION/LOWER URINARY TRACT SYMPTOMS: Primary | ICD-10-CM

## 2024-06-21 DIAGNOSIS — N13.8 BPH WITH OBSTRUCTION/LOWER URINARY TRACT SYMPTOMS: Primary | ICD-10-CM

## 2024-06-21 DIAGNOSIS — N13.8 BPH WITH OBSTRUCTION/LOWER URINARY TRACT SYMPTOMS: ICD-10-CM

## 2024-06-21 DIAGNOSIS — N39.0 URINARY TRACT INFECTION WITHOUT HEMATURIA, SITE UNSPECIFIED: Primary | ICD-10-CM

## 2024-06-21 DIAGNOSIS — N40.1 BPH WITH OBSTRUCTION/LOWER URINARY TRACT SYMPTOMS: ICD-10-CM

## 2024-06-21 PROCEDURE — 87186 SC STD MICRODIL/AGAR DIL: CPT

## 2024-06-21 PROCEDURE — 87086 URINE CULTURE/COLONY COUNT: CPT

## 2024-06-21 PROCEDURE — 87077 CULTURE AEROBIC IDENTIFY: CPT

## 2024-06-21 RX ORDER — AMOXICILLIN AND CLAVULANATE POTASSIUM 875; 125 MG/1; MG/1
1 TABLET, FILM COATED ORAL EVERY 12 HOURS SCHEDULED
Qty: 14 TABLET | Refills: 0 | Status: SHIPPED | OUTPATIENT
Start: 2024-06-21 | End: 2024-06-28

## 2024-06-21 NOTE — TELEPHONE ENCOUNTER
Patient thinks he had a UTI with symptoms of urgency, burning, frequency, a bit of low abdominal pain, cloudy urine and low grade fever of maybe 99.    CB: [phone]

## 2024-06-23 LAB — BACTERIA UR CULT: ABNORMAL

## 2024-06-24 NOTE — TELEPHONE ENCOUNTER
Called and spoke with Ottoniel Bunch Jr. And informed. Advised to contact office with any questions or concerns.Pt verbalized understanding.

## (undated) DEVICE — UROCATCH BAG

## (undated) DEVICE — CATH FOLEY 20FR 5ML 2 WAY SILICONE ELASTIMER

## (undated) DEVICE — PACK TUR

## (undated) DEVICE — BAG URINE DRAINAGE 2000ML ANTI RFLX LF

## (undated) DEVICE — STERILE SURGICAL LUBRICANT,  TUBE: Brand: SURGILUBE

## (undated) DEVICE — CHLORHEXIDINE 4PCT 4 OZ

## (undated) DEVICE — INVIEW CLEAR LEGGINGS: Brand: CONVERTORS

## (undated) DEVICE — GLOVE SRG BIOGEL 7

## (undated) DEVICE — PREMIUM DRY TRAY LF: Brand: MEDLINE INDUSTRIES, INC.